# Patient Record
Sex: FEMALE | Race: WHITE | Employment: FULL TIME | URBAN - METROPOLITAN AREA
[De-identification: names, ages, dates, MRNs, and addresses within clinical notes are randomized per-mention and may not be internally consistent; named-entity substitution may affect disease eponyms.]

---

## 2017-07-21 ENCOUNTER — HOSPITAL ENCOUNTER (OUTPATIENT)
Dept: LAB | Age: 52
Discharge: HOME | End: 2017-07-21

## 2017-07-21 PROCEDURE — 88305 TISSUE EXAM BY PATHOLOGIST: CPT | Performed by: INTERNAL MEDICINE

## 2017-08-11 ENCOUNTER — HOSPITAL ENCOUNTER (OUTPATIENT)
Dept: MAMMOGRAPHY | Age: 52
Discharge: HOME | End: 2017-08-11
Attending: OBSTETRICS & GYNECOLOGY
Payer: COMMERCIAL

## 2017-08-11 DIAGNOSIS — Z12.31 VISIT FOR SCREENING MAMMOGRAM: ICD-10-CM

## 2017-08-11 PROCEDURE — 77067 SCR MAMMO BI INCL CAD: CPT

## 2017-08-23 ENCOUNTER — HOSPITAL ENCOUNTER (OUTPATIENT)
Dept: INFUSION THERAPY | Age: 52
Discharge: HOME | End: 2017-08-23
Payer: COMMERCIAL

## 2017-08-23 VITALS
TEMPERATURE: 98 F | SYSTOLIC BLOOD PRESSURE: 139 MMHG | OXYGEN SATURATION: 99 % | DIASTOLIC BLOOD PRESSURE: 89 MMHG | HEART RATE: 80 BPM | RESPIRATION RATE: 18 BRPM

## 2017-08-23 PROCEDURE — 96365 THER/PROPH/DIAG IV INF INIT: CPT

## 2017-08-23 PROCEDURE — 74011250636 HC RX REV CODE- 250/636

## 2017-08-23 RX ORDER — SODIUM CHLORIDE 0.9 % (FLUSH) 0.9 %
10 SYRINGE (ML) INJECTION AS NEEDED
Status: ACTIVE | OUTPATIENT
Start: 2017-08-23 | End: 2017-08-23

## 2017-08-23 RX ADMIN — SODIUM CHLORIDE: 900 INJECTION, SOLUTION INTRAVENOUS at 07:48

## 2017-08-23 RX ADMIN — Medication 10 ML: at 07:20

## 2017-08-23 RX ADMIN — Medication 10 ML: at 08:20

## 2017-08-23 NOTE — PROGRESS NOTES
Arrived to [facility] to receive Solu-medrol day 1 of 3. Tolerated well. Patient reports receiving medication in the past and tolerating well. Issues or concerns during appointment: none. Aware of next appointment on 8/24 at 7:15 AM.  Discharged ambulatory.

## 2017-08-24 ENCOUNTER — HOSPITAL ENCOUNTER (OUTPATIENT)
Dept: INFUSION THERAPY | Age: 52
Discharge: HOME | End: 2017-08-24
Payer: COMMERCIAL

## 2017-08-24 VITALS
HEART RATE: 70 BPM | TEMPERATURE: 98.2 F | DIASTOLIC BLOOD PRESSURE: 65 MMHG | SYSTOLIC BLOOD PRESSURE: 116 MMHG | OXYGEN SATURATION: 98 % | RESPIRATION RATE: 18 BRPM

## 2017-08-24 PROCEDURE — 74011250636 HC RX REV CODE- 250/636

## 2017-08-24 PROCEDURE — 96365 THER/PROPH/DIAG IV INF INIT: CPT

## 2017-08-24 RX ORDER — SODIUM CHLORIDE 0.9 % (FLUSH) 0.9 %
5 SYRINGE (ML) INJECTION AS NEEDED
Status: DISCONTINUED | OUTPATIENT
Start: 2017-08-24 | End: 2017-08-28 | Stop reason: HOSPADM

## 2017-08-24 RX ADMIN — Medication 5 ML: at 08:17

## 2017-08-24 RX ADMIN — SODIUM CHLORIDE: 900 INJECTION, SOLUTION INTRAVENOUS at 07:46

## 2017-08-24 NOTE — PROGRESS NOTES
Arrived to [facility] to receive Solu-medrol day 2 of 3. Tolerated well. Patient reports receiving medication in the past and tolerating well. Issues or concerns during appointment: none. Aware of next appointment on 8/25 at 7:15 AM.  Discharged ambulatory.

## 2017-08-25 ENCOUNTER — HOSPITAL ENCOUNTER (OUTPATIENT)
Dept: INFUSION THERAPY | Age: 52
Discharge: HOME | End: 2017-08-25
Payer: COMMERCIAL

## 2017-08-25 VITALS
SYSTOLIC BLOOD PRESSURE: 125 MMHG | OXYGEN SATURATION: 99 % | DIASTOLIC BLOOD PRESSURE: 67 MMHG | TEMPERATURE: 97.7 F | HEART RATE: 60 BPM | RESPIRATION RATE: 18 BRPM

## 2017-08-25 PROCEDURE — 96365 THER/PROPH/DIAG IV INF INIT: CPT

## 2017-08-25 PROCEDURE — 74011250636 HC RX REV CODE- 250/636

## 2017-08-25 RX ORDER — SODIUM CHLORIDE 0.9 % (FLUSH) 0.9 %
10-40 SYRINGE (ML) INJECTION AS NEEDED
Status: DISCONTINUED | OUTPATIENT
Start: 2017-08-25 | End: 2017-08-29 | Stop reason: HOSPADM

## 2017-08-25 RX ADMIN — Medication 10 ML: at 07:45

## 2017-08-25 RX ADMIN — SODIUM CHLORIDE: 900 INJECTION, SOLUTION INTRAVENOUS at 08:00

## 2017-08-25 NOTE — PROGRESS NOTES
Pt. Discharged ambulatory. Tolerated infusion well. No distress noted. No return appointment needed at this time.

## 2017-10-05 ENCOUNTER — HOSPITAL ENCOUNTER (OUTPATIENT)
Dept: GENERAL RADIOLOGY | Age: 52
Discharge: HOME | End: 2017-10-05
Payer: COMMERCIAL

## 2017-10-05 DIAGNOSIS — M54.2 NECK PAIN: ICD-10-CM

## 2017-10-05 PROCEDURE — 72050 X-RAY EXAM NECK SPINE 4/5VWS: CPT

## 2017-11-15 PROBLEM — Z79.890 HORMONE REPLACEMENT THERAPY (POSTMENOPAUSAL): Status: ACTIVE | Noted: 2017-11-15

## 2017-11-15 PROBLEM — Z88.8 ASPIRIN ALLERGY: Status: ACTIVE | Noted: 2017-11-15

## 2017-11-15 PROBLEM — E55.9 VITAMIN D DEFICIENCY: Status: ACTIVE | Noted: 2017-11-15

## 2017-11-15 PROBLEM — E78.00 PURE HYPERCHOLESTEROLEMIA: Status: ACTIVE | Noted: 2017-11-15

## 2018-04-09 PROBLEM — Z79.899 ENCOUNTER FOR MEDICATION MANAGEMENT: Status: ACTIVE | Noted: 2018-04-09

## 2018-04-09 PROBLEM — T50.905A MEDICATION SIDE EFFECT, INITIAL ENCOUNTER: Status: ACTIVE | Noted: 2018-04-09

## 2018-10-06 ENCOUNTER — HOSPITAL ENCOUNTER (OUTPATIENT)
Dept: MAMMOGRAPHY | Age: 53
Discharge: HOME | End: 2018-10-06
Attending: OBSTETRICS & GYNECOLOGY
Payer: COMMERCIAL

## 2018-10-06 DIAGNOSIS — Z12.31 VISIT FOR SCREENING MAMMOGRAM: ICD-10-CM

## 2018-10-06 PROCEDURE — 77067 SCR MAMMO BI INCL CAD: CPT

## 2018-11-30 ENCOUNTER — HOSPITAL ENCOUNTER (OUTPATIENT)
Dept: GENERAL RADIOLOGY | Age: 53
Discharge: HOME | End: 2018-11-30
Attending: INTERNAL MEDICINE
Payer: COMMERCIAL

## 2018-11-30 DIAGNOSIS — K21.00 GASTROESOPHAGEAL REFLUX DISEASE WITH ESOPHAGITIS: ICD-10-CM

## 2018-11-30 DIAGNOSIS — G35 MULTIPLE SCLEROSIS (HCC): ICD-10-CM

## 2018-11-30 DIAGNOSIS — E78.00 PURE HYPERCHOLESTEROLEMIA: ICD-10-CM

## 2018-11-30 PROCEDURE — 71046 X-RAY EXAM CHEST 2 VIEWS: CPT

## 2018-12-22 NOTE — PROGRESS NOTES
There is no abnormality on the neck x-ray. You can access the [facility] Portal, offered by [facility], by registering with the following website: [email or web address]

## 2019-01-21 ENCOUNTER — HOSPITAL ENCOUNTER (OUTPATIENT)
Dept: CT IMAGING | Age: 54
Discharge: HOME | End: 2019-01-21
Payer: COMMERCIAL

## 2019-01-21 ENCOUNTER — HOSPITAL ENCOUNTER (OUTPATIENT)
Dept: LAB | Age: 54
Discharge: HOME | End: 2019-01-21
Payer: COMMERCIAL

## 2019-01-21 DIAGNOSIS — R10.10 PAIN OF UPPER ABDOMEN: ICD-10-CM

## 2019-01-21 DIAGNOSIS — R10.2 PELVIC PAIN IN FEMALE: ICD-10-CM

## 2019-01-21 DIAGNOSIS — R11.0 NAUSEA: ICD-10-CM

## 2019-01-21 DIAGNOSIS — R31.9 HEMATURIA, UNSPECIFIED TYPE: ICD-10-CM

## 2019-01-21 LAB
ALBUMIN SERPL-MCNC: 4.1 G/DL (ref 3.5–5)
ALBUMIN/GLOB SERPL: 1.1 {RATIO}
ALP SERPL-CCNC: 90 U/L (ref 50–136)
ALT SERPL-CCNC: 35 U/L (ref 12–65)
ANION GAP SERPL CALC-SCNC: 10 MMOL/L
AST SERPL-CCNC: 22 U/L (ref 15–37)
BASOPHILS # BLD: 0 K/UL (ref 0–0.2)
BASOPHILS NFR BLD: 1 % (ref 0–2)
BILIRUB SERPL-MCNC: 0.5 MG/DL (ref 0.2–1.1)
BUN SERPL-MCNC: 7 MG/DL (ref 6–23)
CALCIUM SERPL-MCNC: 9.2 MG/DL (ref 8.3–10.4)
CHLORIDE SERPL-SCNC: 108 MMOL/L (ref 98–107)
CO2 SERPL-SCNC: 24 MMOL/L (ref 21–32)
CREAT SERPL-MCNC: 0.9 MG/DL (ref 0.6–1)
DIFFERENTIAL METHOD BLD: ABNORMAL
EOSINOPHIL # BLD: 0.1 K/UL (ref 0–0.8)
EOSINOPHIL NFR BLD: 1 % (ref 0.5–7.8)
ERYTHROCYTE [DISTWIDTH] IN BLOOD BY AUTOMATED COUNT: 11.6 % (ref 11.9–14.6)
GLOBULIN SER CALC-MCNC: 3.9 G/DL
GLUCOSE SERPL-MCNC: 93 MG/DL (ref 65–100)
HCT VFR BLD AUTO: 45.7 % (ref 35.8–46.3)
HGB BLD-MCNC: 15.7 G/DL (ref 11.7–15.4)
IMM GRANULOCYTES # BLD AUTO: 0 K/UL (ref 0–0.5)
IMM GRANULOCYTES NFR BLD AUTO: 0 % (ref 0–5)
LIPASE SERPL-CCNC: 140 U/L (ref 73–393)
LYMPHOCYTES # BLD: 1.3 K/UL (ref 0.5–4.6)
LYMPHOCYTES NFR BLD: 22 % (ref 13–44)
MCH RBC QN AUTO: 30.3 PG (ref 26.1–32.9)
MCHC RBC AUTO-ENTMCNC: 34.4 G/DL (ref 31.4–35)
MCV RBC AUTO: 88.2 FL (ref 79.6–97.8)
MONOCYTES # BLD: 0.3 K/UL (ref 0.1–1.3)
MONOCYTES NFR BLD: 4 % (ref 4–12)
NEUTS SEG # BLD: 4.4 K/UL (ref 1.7–8.2)
NEUTS SEG NFR BLD: 72 % (ref 43–78)
NRBC # BLD: 0 K/UL (ref 0–0.2)
PLATELET # BLD AUTO: 261 K/UL (ref 150–450)
PMV BLD AUTO: 9.4 FL (ref 9.4–12.3)
POTASSIUM SERPL-SCNC: 3.4 MMOL/L (ref 3.5–5.1)
PROT SERPL-MCNC: 8 G/DL (ref 6.3–8.2)
RBC # BLD AUTO: 5.18 M/UL (ref 4.05–5.2)
SODIUM SERPL-SCNC: 142 MMOL/L (ref 136–145)
WBC # BLD AUTO: 6.1 K/UL (ref 4.3–11.1)

## 2019-01-21 PROCEDURE — 80053 COMPREHEN METABOLIC PANEL: CPT

## 2019-01-21 PROCEDURE — 83690 ASSAY OF LIPASE: CPT

## 2019-01-21 PROCEDURE — 85025 COMPLETE CBC W/AUTO DIFF WBC: CPT

## 2019-01-21 PROCEDURE — 74176 CT ABD & PELVIS W/O CONTRAST: CPT

## 2019-01-21 PROCEDURE — 36415 COLL VENOUS BLD VENIPUNCTURE: CPT

## 2019-01-21 NOTE — PROGRESS NOTES
The lipase is negative also. This test was done due to the epigastric pain and nausea. It is an evaluation of the pancreas.

## 2019-01-25 ENCOUNTER — HOSPITAL ENCOUNTER (OUTPATIENT)
Dept: GENERAL RADIOLOGY | Age: 54
Discharge: HOME | End: 2019-01-25
Payer: COMMERCIAL

## 2019-01-25 VITALS — WEIGHT: 183 LBS | HEIGHT: 66 IN | BODY MASS INDEX: 29.41 KG/M2

## 2019-01-25 DIAGNOSIS — K21.00 GASTROESOPHAGEAL REFLUX DISEASE WITH ESOPHAGITIS: ICD-10-CM

## 2019-01-25 DIAGNOSIS — R10.10 PAIN OF UPPER ABDOMEN: ICD-10-CM

## 2019-01-25 DIAGNOSIS — R14.0 ABDOMINAL BLOATING: ICD-10-CM

## 2019-01-25 PROCEDURE — 74011000250 HC RX REV CODE- 250: Performed by: NURSE PRACTITIONER

## 2019-01-25 PROCEDURE — 74011000255 HC RX REV CODE- 255: Performed by: NURSE PRACTITIONER

## 2019-01-25 PROCEDURE — 74241 XR UPPER GI SERIES W KUB: CPT

## 2019-01-25 RX ADMIN — BARIUM SULFATE 135 ML: 980 POWDER, FOR SUSPENSION ORAL at 09:13

## 2019-01-25 RX ADMIN — BARIUM SULFATE 60 ML: 0.6 SUSPENSION ORAL at 09:15

## 2019-01-25 RX ADMIN — BARIUM SULFATE 700 MG: 700 TABLET ORAL at 09:16

## 2019-01-25 RX ADMIN — ANTACID/ANTIFLATULENT 4 G: 380; 550; 10; 10 GRANULE, EFFERVESCENT ORAL at 09:18

## 2019-02-15 ENCOUNTER — HOSPITAL ENCOUNTER (OUTPATIENT)
Dept: SURGERY | Age: 54
Discharge: HOME | End: 2019-02-15

## 2019-02-18 VITALS — BODY MASS INDEX: 28.25 KG/M2 | HEIGHT: 67 IN | WEIGHT: 180 LBS

## 2019-02-18 RX ORDER — AMOXICILLIN AND CLAVULANATE POTASSIUM 875; 125 MG/1; MG/1
TABLET, FILM COATED ORAL 2 TIMES DAILY
COMMUNITY
End: 2019-03-11

## 2019-02-18 RX ORDER — LEVOCETIRIZINE DIHYDROCHLORIDE 5 MG/1
5 TABLET, FILM COATED ORAL
COMMUNITY
End: 2020-05-21

## 2019-02-18 NOTE — PERIOP NOTES
Patient verified name and . Order for consent NOT found in EHR, unable to confirm case posting at this time; patient verifies procedure. Type 1B surgery, PAT phone assessment complete. Orders NOT received. Labs per surgeon: No orders received at this time. Labs per anesthesia protocol: None. Patient reports  mg of Augmentin x 10 days on 19 due to \"cold;\" surgery scheduled 19. Patient repots flu test negative and strep test negative. Ren Santamaria, Dr. Ankush Levin nurse, made aware, \"I think we are good with that but I'll pass it on. \"       Patient answered medical/surgical history questions at their best of ability. All prior to admission medications documented in [facility]. Patient instructed to take the following medications the day of surgery according to anesthesia guidelines with a small sip of water: Estradiol, Omeprazole, and Valtrex. Hold all vitamins 7 days prior to surgery and NSAIDS 5 days prior to surgery. Patient instructed on the following:  Arrive at [address], time of arrival to be called the day before by 1700  NPO after midnight including gum, mints, and ice chips  Responsible adult must drive patient to the hospital, stay during surgery, and patient will need supervision 24 hours after anesthesia  Use anti-bacterial soap in shower the night before surgery and on the morning of surgery  All piercings must be removed prior to arrival.    Leave all valuables (money and jewelry) at home but bring insurance card and ID on       DOS. Do not wear make-up, nail polish, lotions, cologne, perfumes, powders, or oil on skin. Patient teach back successful and patient demonstrates knowledge of instruction.

## 2019-02-21 ENCOUNTER — ANESTHESIA EVENT (OUTPATIENT)
Dept: SURGERY | Age: 54
End: 2019-02-21
Payer: COMMERCIAL

## 2019-02-22 ENCOUNTER — HOSPITAL ENCOUNTER (OUTPATIENT)
Age: 54
Setting detail: OUTPATIENT SURGERY
Discharge: HOME | End: 2019-02-22
Attending: OBSTETRICS & GYNECOLOGY | Admitting: OBSTETRICS & GYNECOLOGY
Payer: COMMERCIAL

## 2019-02-22 ENCOUNTER — ANESTHESIA (OUTPATIENT)
Dept: SURGERY | Age: 54
End: 2019-02-22
Payer: COMMERCIAL

## 2019-02-22 VITALS
RESPIRATION RATE: 16 BRPM | OXYGEN SATURATION: 94 % | HEART RATE: 55 BPM | DIASTOLIC BLOOD PRESSURE: 65 MMHG | SYSTOLIC BLOOD PRESSURE: 133 MMHG | TEMPERATURE: 98 F

## 2019-02-22 DIAGNOSIS — G89.18 POSTOPERATIVE PAIN: Primary | ICD-10-CM

## 2019-02-22 LAB — HCG UR QL: NEGATIVE

## 2019-02-22 PROCEDURE — 77030020782 HC GWN BAIR PAWS FLX 3M -B: Performed by: NURSE ANESTHETIST, CERTIFIED REGISTERED

## 2019-02-22 PROCEDURE — 76210000020 HC REC RM PH II FIRST 0.5 HR: Performed by: OBSTETRICS & GYNECOLOGY

## 2019-02-22 PROCEDURE — 76210000006 HC OR PH I REC 0.5 TO 1 HR: Performed by: OBSTETRICS & GYNECOLOGY

## 2019-02-22 PROCEDURE — 77030010509 HC AIRWY LMA MSK TELE -A: Performed by: NURSE ANESTHETIST, CERTIFIED REGISTERED

## 2019-02-22 PROCEDURE — 77030012317 HC CATH URET INT COVD -A: Performed by: OBSTETRICS & GYNECOLOGY

## 2019-02-22 PROCEDURE — 74011250636 HC RX REV CODE- 250/636

## 2019-02-22 PROCEDURE — 77030032490 HC SLV COMPR SCD KNE COVD -B: Performed by: OBSTETRICS & GYNECOLOGY

## 2019-02-22 PROCEDURE — 88305 TISSUE EXAM BY PATHOLOGIST: CPT

## 2019-02-22 PROCEDURE — 81025 URINE PREGNANCY TEST: CPT

## 2019-02-22 PROCEDURE — 77030018836 HC SOL IRR NACL ICUM -A: Performed by: OBSTETRICS & GYNECOLOGY

## 2019-02-22 PROCEDURE — 74011250636 HC RX REV CODE- 250/636: Performed by: ANESTHESIOLOGY

## 2019-02-22 PROCEDURE — 76060000032 HC ANESTHESIA 0.5 TO 1 HR: Performed by: OBSTETRICS & GYNECOLOGY

## 2019-02-22 PROCEDURE — 76010000138 HC OR TIME 0.5 TO 1 HR: Performed by: OBSTETRICS & GYNECOLOGY

## 2019-02-22 RX ORDER — SODIUM CHLORIDE, SODIUM LACTATE, POTASSIUM CHLORIDE, CALCIUM CHLORIDE 600; 310; 30; 20 MG/100ML; MG/100ML; MG/100ML; MG/100ML
100 INJECTION, SOLUTION INTRAVENOUS CONTINUOUS
Status: DISCONTINUED | OUTPATIENT
Start: 2019-02-22 | End: 2019-02-22 | Stop reason: HOSPADM

## 2019-02-22 RX ORDER — PROPOFOL 10 MG/ML
INJECTION, EMULSION INTRAVENOUS AS NEEDED
Status: DISCONTINUED | OUTPATIENT
Start: 2019-02-22 | End: 2019-02-22 | Stop reason: HOSPADM

## 2019-02-22 RX ORDER — LIDOCAINE HYDROCHLORIDE 10 MG/ML
0.1 INJECTION INFILTRATION; PERINEURAL AS NEEDED
Status: DISCONTINUED | OUTPATIENT
Start: 2019-02-22 | End: 2019-02-22 | Stop reason: HOSPADM

## 2019-02-22 RX ORDER — FENTANYL CITRATE 50 UG/ML
100 INJECTION, SOLUTION INTRAMUSCULAR; INTRAVENOUS ONCE
Status: DISCONTINUED | OUTPATIENT
Start: 2019-02-22 | End: 2019-02-22 | Stop reason: HOSPADM

## 2019-02-22 RX ORDER — TRAMADOL HYDROCHLORIDE 50 MG/1
50 TABLET ORAL
Qty: 8 TAB | Refills: 0 | Status: SHIPPED | OUTPATIENT
Start: 2019-02-22 | End: 2019-03-11

## 2019-02-22 RX ORDER — MIDAZOLAM HYDROCHLORIDE 1 MG/ML
2 INJECTION, SOLUTION INTRAMUSCULAR; INTRAVENOUS
Status: DISCONTINUED | OUTPATIENT
Start: 2019-02-22 | End: 2019-02-22 | Stop reason: HOSPADM

## 2019-02-22 RX ORDER — HYDROMORPHONE HYDROCHLORIDE 2 MG/ML
0.5 INJECTION, SOLUTION INTRAMUSCULAR; INTRAVENOUS; SUBCUTANEOUS
Status: DISCONTINUED | OUTPATIENT
Start: 2019-02-22 | End: 2019-02-22 | Stop reason: HOSPADM

## 2019-02-22 RX ORDER — MIDAZOLAM HYDROCHLORIDE 1 MG/ML
2 INJECTION, SOLUTION INTRAMUSCULAR; INTRAVENOUS ONCE
Status: COMPLETED | OUTPATIENT
Start: 2019-02-22 | End: 2019-02-22

## 2019-02-22 RX ORDER — OXYCODONE HYDROCHLORIDE 5 MG/1
5 TABLET ORAL
Status: DISCONTINUED | OUTPATIENT
Start: 2019-02-22 | End: 2019-02-22 | Stop reason: HOSPADM

## 2019-02-22 RX ORDER — ONDANSETRON 2 MG/ML
INJECTION INTRAMUSCULAR; INTRAVENOUS AS NEEDED
Status: DISCONTINUED | OUTPATIENT
Start: 2019-02-22 | End: 2019-02-22 | Stop reason: HOSPADM

## 2019-02-22 RX ORDER — DEXAMETHASONE SODIUM PHOSPHATE 4 MG/ML
INJECTION, SOLUTION INTRA-ARTICULAR; INTRALESIONAL; INTRAMUSCULAR; INTRAVENOUS; SOFT TISSUE AS NEEDED
Status: DISCONTINUED | OUTPATIENT
Start: 2019-02-22 | End: 2019-02-22 | Stop reason: HOSPADM

## 2019-02-22 RX ORDER — FENTANYL CITRATE 50 UG/ML
INJECTION, SOLUTION INTRAMUSCULAR; INTRAVENOUS AS NEEDED
Status: DISCONTINUED | OUTPATIENT
Start: 2019-02-22 | End: 2019-02-22 | Stop reason: HOSPADM

## 2019-02-22 RX ORDER — LIDOCAINE HYDROCHLORIDE 20 MG/ML
INJECTION, SOLUTION EPIDURAL; INFILTRATION; INTRACAUDAL; PERINEURAL AS NEEDED
Status: DISCONTINUED | OUTPATIENT
Start: 2019-02-22 | End: 2019-02-22 | Stop reason: HOSPADM

## 2019-02-22 RX ORDER — NALOXONE HYDROCHLORIDE 0.4 MG/ML
0.04 INJECTION, SOLUTION INTRAMUSCULAR; INTRAVENOUS; SUBCUTANEOUS
Status: DISCONTINUED | OUTPATIENT
Start: 2019-02-22 | End: 2019-02-22 | Stop reason: HOSPADM

## 2019-02-22 RX ADMIN — HYDROMORPHONE HYDROCHLORIDE 0.5 MG: 2 INJECTION, SOLUTION INTRAMUSCULAR; INTRAVENOUS; SUBCUTANEOUS at 11:08

## 2019-02-22 RX ADMIN — SODIUM CHLORIDE, SODIUM LACTATE, POTASSIUM CHLORIDE, AND CALCIUM CHLORIDE 100 ML/HR: 600; 310; 30; 20 INJECTION, SOLUTION INTRAVENOUS at 09:58

## 2019-02-22 RX ADMIN — LIDOCAINE HYDROCHLORIDE 100 MG: 20 INJECTION, SOLUTION EPIDURAL; INFILTRATION; INTRACAUDAL; PERINEURAL at 10:27

## 2019-02-22 RX ADMIN — FENTANYL CITRATE 25 MCG: 50 INJECTION, SOLUTION INTRAMUSCULAR; INTRAVENOUS at 10:41

## 2019-02-22 RX ADMIN — PROPOFOL 200 MG: 10 INJECTION, EMULSION INTRAVENOUS at 10:27

## 2019-02-22 RX ADMIN — DEXAMETHASONE SODIUM PHOSPHATE 10 MG: 4 INJECTION, SOLUTION INTRA-ARTICULAR; INTRALESIONAL; INTRAMUSCULAR; INTRAVENOUS; SOFT TISSUE at 10:36

## 2019-02-22 RX ADMIN — FENTANYL CITRATE 25 MCG: 50 INJECTION, SOLUTION INTRAMUSCULAR; INTRAVENOUS at 10:39

## 2019-02-22 RX ADMIN — HYDROMORPHONE HYDROCHLORIDE 0.5 MG: 2 INJECTION, SOLUTION INTRAMUSCULAR; INTRAVENOUS; SUBCUTANEOUS at 11:15

## 2019-02-22 RX ADMIN — MIDAZOLAM 2 MG: 1 INJECTION INTRAMUSCULAR; INTRAVENOUS at 10:14

## 2019-02-22 RX ADMIN — ONDANSETRON 4 MG: 2 INJECTION INTRAMUSCULAR; INTRAVENOUS at 10:37

## 2019-02-22 RX ADMIN — FENTANYL CITRATE 50 MCG: 50 INJECTION, SOLUTION INTRAMUSCULAR; INTRAVENOUS at 10:27

## 2019-02-22 NOTE — ANESTHESIA POSTPROCEDURE EVALUATION
Procedure(s): DILATATION AND CURETTAGE HYSTEROSCOPY. Anesthesia Post Evaluation Patient location during evaluation: PACU Patient participation: complete - patient participated Level of consciousness: awake Pain management: satisfactory to patient Airway patency: patent Anesthetic complications: no 
Cardiovascular status: hemodynamically stable Respiratory status: spontaneous ventilation Hydration status: euvolemic Post anesthesia nausea and vomiting:  none Visit Vitals /80 Pulse 78 Temp 36.7 °C (98 °F) Resp 16 SpO2 95%

## 2019-02-22 NOTE — OP NOTES
HYSTEROSCOPY WITH DILATATION AND CURETTAGE    DATE OF PROCEDURE: 2/22/2019     PREOPERATIVE DIAGNOSIS: Thickened endometrium [R93.89]  Postmenopausal bleeding [N95.0]  Endometrial polyp [N84.0]     POSTOPERATWE DIAGNOSIS: Thickened endometrium [R93.89]  Postmenopausal bleeding [N95.0]  Endometrial polyp [N84.0]    PROCEDURE: Hysteroscopy with dilatation & curettage. SURGEON: Hardy Clemons MD    ANESTHESIA: General.    DRAINS: Sterile in and out catheterization of the bladder. FINDINGS: endometrial thickening, polypoid area noted    PROCEDURE IN DETAIL: The patient was taken to the Operating Room. After adequate anesthesia was established she was properly positioned, scrubbed, prepped and draped. Time out was done to confirm the operating procedure, surgeon, patient and site. Once confirmed by the team, procedure was started. The weighted speculum was placed in the vault to expose the cervix, was grasped at 12 oclock with the single-tooth tenaculum and sounded to 8 cm. It was sequentially dilated prior to the hysteroscope being inserted with the above noted findings. Polypoid area removed with stone forceps. A very gentle but homogenous curetting was done starting in the midline and working in a clockwise manner. Endometrial tissue was retrieved. The hysteroscope was reinserted again. With this complete and thorough visual review, we terminated the procedure. With the sponge, instrument and needle count correct, the patient was awakened and taken to the Recovery Room in satisfactory condition. BLOOD LOSS ESTIMATED: Minimal    SPECIMENS:Endometrial curettings. Pt discharged to home . Precautions given . Appt 2 weeks.  Rx for Tramadol  50 mg (#8)

## 2019-02-22 NOTE — ANESTHESIA PREPROCEDURE EVALUATION
Anesthetic History No history of anesthetic complications Review of Systems / Medical History Pertinent labs reviewed Pulmonary Within defined limits Neuro/Psych Neuromuscular disease (MS, balance issues, optic neuritis) and psychiatric history Cardiovascular Within defined limits Exercise tolerance: >4 METS 
  
GI/Hepatic/Renal 
  
GERD: well controlled Endo/Other Within defined limits Other Findings Physical Exam 
 
Airway Mallampati: II 
TM Distance: 4 - 6 cm Neck ROM: normal range of motion Mouth opening: Normal 
 
 Cardiovascular Regular rate and rhythm,  S1 and S2 normal,  no murmur, click, rub, or gallop Dental 
No notable dental hx Pulmonary Breath sounds clear to auscultation Abdominal 
GI exam deferred Other Findings Anesthetic Plan ASA: 2 Anesthesia type: general 
 
 
 
 
Induction: Intravenous Anesthetic plan and risks discussed with: Patient and Spouse

## 2019-02-22 NOTE — H&P
Subjective:  
 
Patient is a 53 y.o.  female presents with postmenopausal bleeding. gradually worsening course. Patient Active Problem List  
 Diagnosis Date Noted  Medication side effect, initial encounter 2018  Encounter for medication management 2018  Hormone replacement therapy (postmenopausal) 11/15/2017  Vitamin D deficiency 11/15/2017  Pure hypercholesterolemia 11/15/2017  Aspirin allergy 11/15/2017  Elevated ALT measurement 2016  Pelvic pain in female 2016  S/P cataract extraction and insertion of intraocular lens 2016  Relapsing remitting multiple sclerosis ([facility] [address]  GERD (gastroesophageal reflux disease)  Personal history of colonic polyps  Internal hemorrhoids Past Medical History:  
Diagnosis Date  Anxiety  Autoimmune disease ([address] MS  
 Endometriosis  Environmental and seasonal allergies  Family history of colonic polyps   
 mother  Fever blister  GERD (gastroesophageal reflux disease)   
 controlled with medication  H/O echocardiogram 2017 EF 55-60%  History of palpitations r/t medication  Internal hemorrhoids without mention of complication 4339  Multiple sclerosis ([facility] [address] Followed by Dr. Linda Way, no medication at this time (19)  Ovarian cyst   
 Personal history of colonic polyps   
 TVA, TA  
 Rectocele   S/P cataract extraction and insertion of intraocular lens 3/3/2016 Past Surgical History:  
Procedure Laterality Date  HX CATARACT REMOVAL Bilateral   
 with iol  HX  SECTION    
 HX COLONOSCOPY  2014, 2017 -[facility]--1.5 cm sigmoid TVA, 5 small asc TAs--3 year recall;  -- mixed TVA  HX PELVIC LAPAROSCOPY    
 laproscopic for endometriosis x14  
  
[unfilled] Allergies Allergen Reactions  Latex Itching  Tecfidera [Dimethyl Fumarate] Other (comments) Hip pain severe dissipated on d/c Tecfidera. Palpitations  Adhesive Tape-Silicones Unknown (comments)  Aspirin Swelling  Flagyl [Metronidazole] Shortness of Breath  Milk Containing Products Hives Stomach pain  Shellfish Derived Hives Pt has never had a reaction to IV contrast Dye Social History Tobacco Use  Smoking status: Former Smoker Packs/day: 0.50 Years: 17.00 Pack years: 8.50 Last attempt to quit: 2001 Years since quittin.1  Smokeless tobacco: Never Used Substance Use Topics  Alcohol use: No  
  
Family History Problem Relation Age of Onset  Hypertension Mother  Hypertension Father  Diabetes Father  Heart Disease Father  Elevated Lipids Father  Diabetes Brother  Elevated Lipids Brother  Hypertension Brother  Breast Cancer Paternal Aunt  Heart Disease Maternal Grandfather  Stroke Paternal Grandmother  Heart Disease Paternal Grandmother  Breast Cancer Maternal Grandmother 79 Review of Systems A comprehensive review of systems was negative except for that written in the HPI. Objective:  
 
No data found. No intake or output data in the 24 hours ending 19 0945 General: Alert . Oriented x3 HEENT: Normocephalic PEERL Heart: RRR Lungs: Clear Abdominal: Bowel sounds are normal, liver is not enlarged, spleen is not enlarged Neurological: Alert and oriented X 3, normal strength and tone. Normal symmetric reflexes. Normal coordination and gait Extremities: extremities normal, atraumatic, no cyanosis or edema Assessment:  
 
Patient Active Problem List  
 Diagnosis Date Noted  Medication side effect, initial encounter 2018  Encounter for medication management 2018  Hormone replacement therapy (postmenopausal) 11/15/2017  Vitamin D deficiency 11/15/2017  Pure hypercholesterolemia 11/15/2017  Aspirin allergy 11/15/2017  Elevated ALT measurement 2016  Pelvic pain in female 09/29/2016  S/P cataract extraction and insertion of intraocular lens 03/03/2016  Relapsing remitting multiple sclerosis ([facility] [address]  GERD (gastroesophageal reflux disease)  Personal history of colonic polyps  Internal hemorrhoids Postmenopausal bleeding Plan:  
 
 
Procedure(s): DILATATION AND CURETTAGE HYSTEROSCOPY

## 2019-02-22 NOTE — DISCHARGE INSTRUCTIONS
Dilation and Curettage: What to Expect at Home  Your Recovery  Dilation and curettage (D&C) is a procedure to remove tissue from the inside of the uterus. The doctor used a curved tool, called a curette, to gently scrape tissue from your uterus. You are likely to have a backache, or cramps similar to menstrual cramps, and pass small clots of blood from your vagina for the first few days. You may continue to have light vaginal bleeding for several weeks after the procedure. You will probably be able to go back to most of your normal activities in 1 or 2 days. This care sheet gives you a general idea about how long it will take for you to recover. But each person recovers at a different pace. Follow the steps below to get better as quickly as possible. How can you care for yourself at home? Activity    · Rest when you feel tired. Getting enough sleep will help you recover.     · Avoid strenuous activities, such as bicycle riding, jogging, weight lifting, or aerobic exercise, until your doctor says it is okay.     · Most women are able to return to work the day after the procedure.     · You may have some light vaginal bleeding. Wear sanitary pads if needed. Do not douche or use tampons for 2 weeks or until your doctor says it is okay.     · Ask your doctor when it is okay for you to have sex.     · If you could become pregnant, talk about birth control with your doctor. Do not try to become pregnant until your doctor says it is okay. Diet    · You can eat your normal diet. If your stomach is upset, try bland, low-fat foods like plain rice, broiled chicken, toast, and yogurt.     · Drink plenty of fluids (unless your doctor tells you not to). Medicines    · Your doctor will tell you if and when you can restart your medicines.  He or she will also give you instructions about taking any new medicines.     · If you take blood thinners, such as warfarin (Coumadin), clopidogrel (Plavix), or aspirin, be sure to talk to your doctor. He or she will tell you if and when to start taking those medicines again. Make sure that you understand exactly what your doctor wants you to do.     · Be safe with medicines. Take pain medicines exactly as directed. ? If the doctor gave you a prescription medicine for pain, take it as prescribed. ? If you are not taking a prescription pain medicine, ask your doctor if you can take an over-the-counter medicine.     · If you think your pain medicine is making you sick to your stomach:  ? Take your medicine after meals (unless your doctor has told you not to). ? Ask your doctor for a different pain medicine.     · If your doctor prescribed antibiotics, take them as directed. Do not stop taking them just because you feel better. You need to take the full course of antibiotics. Follow-up care is a key part of your treatment and safety. Be sure to make and go to all appointments, and call your doctor if you are having problems. It's also a good idea to know your test results and keep a list of the medicines you take. When should you call for help? Call 911 anytime you think you may need emergency care. For example, call if:    · You passed out (lost consciousness).     · You have chest pain, are short of breath, or cough up blood.    Call your doctor now or seek immediate medical care if:    · You have bright red vaginal bleeding that soaks one or more pads in an hour, or you have large clots.     · You have vaginal discharge that increases in amount or smells bad.     · You are sick to your stomach or cannot drink fluids.     · You have pain that does not get better after you take pain medicine.     · You cannot pass stools or gas.     · You have symptoms of a blood clot in your leg (called a deep vein thrombosis), such as:  ? Pain in your calf, back of the knee, thigh, or groin. ?  Redness and swelling in your leg.     · You have signs of infection, such as:  ? Increased pain, swelling, warmth, or redness. ? Red streaks leading from the area. ? Pus draining from the area. ? A fever.    Watch closely for changes in your health, and be sure to contact your doctor if you have any problems.

## 2019-02-25 NOTE — PROGRESS NOTES
Called pt today regarding a chipped tooth from surgery last Friday. I was made aware of this by [facility] today. I have no recollection of any problems during her care that could have led to a chipped tooth and none surfaced from a review of her record. This is especially odd since we used an LMA. She describes a small chip out of her \"right front tooth\". She plans to visit the dentist to see what options she has to improve the appearance. I provided her with the phone number for risk management. She was not in any way upset, but did express interest in contacting risk management to help with the cost of repair.

## 2019-10-03 ENCOUNTER — HOSPITAL ENCOUNTER (OUTPATIENT)
Dept: GENERAL RADIOLOGY | Age: 54
Discharge: HOME | End: 2019-10-03

## 2019-10-03 DIAGNOSIS — Z00.00 ROUTINE GENERAL MEDICAL EXAMINATION AT A HEALTH CARE FACILITY: ICD-10-CM

## 2019-11-19 PROBLEM — T50.905A MEDICATION SIDE EFFECT, INITIAL ENCOUNTER: Status: RESOLVED | Noted: 2018-04-09 | Resolved: 2019-11-19

## 2019-11-19 PROBLEM — R20.2 PARESTHESIA: Status: ACTIVE | Noted: 2019-11-19

## 2019-11-27 ENCOUNTER — HOSPITAL ENCOUNTER (OUTPATIENT)
Dept: GENERAL RADIOLOGY | Age: 54
Discharge: HOME | End: 2019-11-27
Attending: INTERNAL MEDICINE
Payer: COMMERCIAL

## 2019-11-27 DIAGNOSIS — R94.2 ABNORMAL PFT: ICD-10-CM

## 2019-11-27 PROCEDURE — 71046 X-RAY EXAM CHEST 2 VIEWS: CPT

## 2020-02-20 ENCOUNTER — HOSPITAL ENCOUNTER (OUTPATIENT)
Dept: MAMMOGRAPHY | Age: 55
Discharge: HOME | End: 2020-02-20
Attending: OBSTETRICS & GYNECOLOGY

## 2020-02-20 DIAGNOSIS — Z12.31 VISIT FOR SCREENING MAMMOGRAM: ICD-10-CM

## 2020-06-04 ENCOUNTER — HOSPITAL ENCOUNTER (OUTPATIENT)
Dept: CT IMAGING | Age: 55
Discharge: HOME | End: 2020-06-04
Attending: INTERNAL MEDICINE
Payer: COMMERCIAL

## 2020-06-04 DIAGNOSIS — R94.2 DECREASED DIFFUSION CAPACITY OF LUNG: ICD-10-CM

## 2020-06-04 DIAGNOSIS — R06.02 SOB (SHORTNESS OF BREATH): ICD-10-CM

## 2020-06-04 PROCEDURE — 71250 CT THORAX DX C-: CPT

## 2021-02-09 PROCEDURE — 88305 TISSUE EXAM BY PATHOLOGIST: CPT

## 2021-02-10 ENCOUNTER — HOSPITAL ENCOUNTER (OUTPATIENT)
Dept: LAB | Age: 56
Discharge: HOME | End: 2021-02-10

## 2021-02-11 NOTE — PROGRESS NOTES
Spoke to patient and relayed result. Patient voiced understanding and had no further questions at this time.

## 2021-03-24 PROBLEM — Z87.898 HX OF FEBRILE SEIZURE: Status: ACTIVE | Noted: 2021-03-24

## 2021-04-26 ENCOUNTER — TRANSCRIBE ORDER (OUTPATIENT)
Dept: SCHEDULING | Age: 56
End: 2021-04-26

## 2021-04-26 DIAGNOSIS — Z12.31 SCREENING MAMMOGRAM FOR HIGH-RISK PATIENT: Primary | ICD-10-CM

## 2021-06-07 ENCOUNTER — HOSPITAL ENCOUNTER (OUTPATIENT)
Dept: MAMMOGRAPHY | Age: 56
Discharge: HOME | End: 2021-06-07
Attending: NURSE PRACTITIONER
Payer: COMMERCIAL

## 2021-06-07 DIAGNOSIS — Z12.31 SCREENING MAMMOGRAM FOR HIGH-RISK PATIENT: ICD-10-CM

## 2021-06-07 PROCEDURE — 77063 BREAST TOMOSYNTHESIS BI: CPT

## 2021-09-07 PROBLEM — F41.9 ANXIETY: Status: ACTIVE | Noted: 2021-09-07

## 2021-11-29 PROBLEM — R93.1 ELEVATED CORONARY ARTERY CALCIUM SCORE: Chronic | Status: ACTIVE | Noted: 2021-11-29

## 2022-03-18 PROBLEM — Z79.899 ENCOUNTER FOR MEDICATION MANAGEMENT: Status: ACTIVE | Noted: 2018-04-09

## 2022-03-19 PROBLEM — Z88.8 ASPIRIN ALLERGY: Status: ACTIVE | Noted: 2017-11-15

## 2022-03-19 PROBLEM — F41.9 ANXIETY: Status: ACTIVE | Noted: 2021-09-07

## 2022-03-19 PROBLEM — E78.00 PURE HYPERCHOLESTEROLEMIA: Status: ACTIVE | Noted: 2017-11-15

## 2022-03-19 PROBLEM — R93.1 ELEVATED CORONARY ARTERY CALCIUM SCORE: Status: ACTIVE | Noted: 2021-11-29

## 2022-03-19 PROBLEM — E55.9 VITAMIN D DEFICIENCY: Status: ACTIVE | Noted: 2017-11-15

## 2022-03-19 PROBLEM — Z79.890 HORMONE REPLACEMENT THERAPY (POSTMENOPAUSAL): Status: ACTIVE | Noted: 2017-11-15

## 2022-03-19 PROBLEM — Z87.898 HX OF IDIOPATHIC SEIZURE: Status: ACTIVE | Noted: 2021-03-24

## 2022-03-19 PROBLEM — T50.905A MEDICATION SIDE EFFECT, INITIAL ENCOUNTER: Status: ACTIVE | Noted: 2018-04-09

## 2022-03-20 PROBLEM — R20.2 PARESTHESIA: Status: ACTIVE | Noted: 2019-11-19

## 2022-04-21 DIAGNOSIS — E78.00 PURE HYPERCHOLESTEROLEMIA: ICD-10-CM

## 2022-04-21 DIAGNOSIS — R74.01 ELEVATED ALT MEASUREMENT: Primary | ICD-10-CM

## 2022-05-24 DIAGNOSIS — R74.01 ELEVATED ALT MEASUREMENT: ICD-10-CM

## 2022-05-24 DIAGNOSIS — E78.00 PURE HYPERCHOLESTEROLEMIA: ICD-10-CM

## 2022-05-24 LAB
ALBUMIN SERPL-MCNC: 4.2 G/DL (ref 3.5–5)
ALBUMIN/GLOB SERPL: 1.2 {RATIO} (ref 1.2–3.5)
ALP SERPL-CCNC: 68 U/L (ref 50–136)
ALT SERPL-CCNC: 27 U/L (ref 12–65)
ANION GAP SERPL CALC-SCNC: 8 MMOL/L (ref 7–16)
AST SERPL-CCNC: 11 U/L (ref 15–37)
BILIRUB SERPL-MCNC: 0.5 MG/DL (ref 0.2–1.1)
BUN SERPL-MCNC: 5 MG/DL (ref 6–23)
CALCIUM SERPL-MCNC: 9.5 MG/DL (ref 8.3–10.4)
CHLORIDE SERPL-SCNC: 107 MMOL/L (ref 98–107)
CHOLEST SERPL-MCNC: 216 MG/DL
CO2 SERPL-SCNC: 24 MMOL/L (ref 21–32)
CREAT SERPL-MCNC: 0.7 MG/DL (ref 0.6–1)
GLOBULIN SER CALC-MCNC: 3.5 G/DL (ref 2.3–3.5)
GLUCOSE SERPL-MCNC: 76 MG/DL (ref 65–100)
HDLC SERPL-MCNC: 63 MG/DL (ref 40–60)
HDLC SERPL: 3.4 {RATIO}
LDLC SERPL CALC-MCNC: 127.4 MG/DL
POTASSIUM SERPL-SCNC: 4.1 MMOL/L (ref 3.5–5.1)
PROT SERPL-MCNC: 7.7 G/DL (ref 6.3–8.2)
SODIUM SERPL-SCNC: 139 MMOL/L (ref 136–145)
TRIGL SERPL-MCNC: 128 MG/DL (ref 35–150)
VLDLC SERPL CALC-MCNC: 25.6 MG/DL (ref 6–23)

## 2022-06-09 ENCOUNTER — OFFICE VISIT (OUTPATIENT)
Dept: GYNECOLOGY | Age: 57
End: 2022-06-09
Payer: COMMERCIAL

## 2022-06-09 VITALS
HEIGHT: 65 IN | SYSTOLIC BLOOD PRESSURE: 120 MMHG | WEIGHT: 174 LBS | DIASTOLIC BLOOD PRESSURE: 78 MMHG | BODY MASS INDEX: 28.99 KG/M2

## 2022-06-09 DIAGNOSIS — N95.1 MENOPAUSE SYNDROME: Primary | ICD-10-CM

## 2022-06-09 PROCEDURE — 99214 OFFICE O/P EST MOD 30 MIN: CPT | Performed by: OBSTETRICS & GYNECOLOGY

## 2022-06-09 RX ORDER — MEDROXYPROGESTERONE ACETATE 2.5 MG/1
2.5 TABLET ORAL DAILY
Qty: 90 TABLET | Refills: 3 | Status: SHIPPED | OUTPATIENT
Start: 2022-06-09 | End: 2022-07-11 | Stop reason: ALTCHOICE

## 2022-06-09 RX ORDER — ESTRADIOL 0.1 MG/D
1 FILM, EXTENDED RELEASE TRANSDERMAL
Qty: 24 PATCH | Refills: 3 | Status: SHIPPED | OUTPATIENT
Start: 2022-06-09 | End: 2022-07-11 | Stop reason: ALTCHOICE

## 2022-06-09 NOTE — PROGRESS NOTES
Melva Joshi is a 61 y.o. female seen to discuss hormones. She c/o hot flashes and night sweats but everything she has been tried on has caused her cramping. The hormone that worked the best for her was the patch but even this caused cramping. She has not been taking the progesterone with the estrogen. Past Medical History, Past Surgical History, Family history, Social History, and Medications were all reviewed with the patient today and updated as necessary. Current Outpatient Medications   Medication Sig    estradiol (VIVELLE-DOT) 0.1 MG/24HR Place 1 patch onto the skin Twice a Week    medroxyPROGESTERone (PROVERA) 2.5 MG tablet Take 1 tablet by mouth daily    atorvastatin (LIPITOR) 10 MG tablet Take 10 mg by mouth daily    cyanocobalamin 1000 MCG/ML injection Inject 1,000 mcg into the muscle    EPINEPHrine (EPIPEN 2-JUAN CARLOS) 0.3 MG/0.3ML SOAJ injection USE AS DIRECTED AS NEEDED    ergocalciferol (ERGOCALCIFEROL) 1.25 MG (59664 UT) capsule Take 50,000 Units by mouth Twice a Week    estradiol (ESTRACE) 0.5 MG tablet Take 0.5 mg by mouth daily    fluticasone (FLONASE) 50 MCG/ACT nasal spray 2 sprays by Nasal route daily    hydrOXYzine (VISTARIL) 25 MG capsule Take 25 mg by mouth 4 times daily as needed    loratadine (CLARITIN) 10 MG tablet TAKE 1 TABLET BY MOUTH EVERY DAY    pantoprazole (PROTONIX) 20 MG tablet Take 40 mg by mouth daily    valACYclovir (VALTREX) 500 MG tablet Take 500 mg by mouth 2 times daily (Patient not taking: Reported on 6/9/2022)     No current facility-administered medications for this visit. Allergies   Allergen Reactions    Latex Itching    Dimethyl Fumarate Other (See Comments)     Hip pain severe dissipated on d/c Tecfidera.    Palpitations     Metronidazole Shortness Of Breath    Aspirin Swelling    Glatiramer Swelling     Welts after injections.       Past Medical History:   Diagnosis Date    Anxiety     Autoimmune disease ([facility] [address]     MS    Endometriosis     Environmental and seasonal allergies     Family history of colonic polyps     mother    Fever blister     GERD (gastroesophageal reflux disease)     controlled with medication     H/O echocardiogram 2017    EF 55-60%    History of palpitations     r/t medication     Internal hemorrhoids without mention of complication 5331    Multiple sclerosis ([facility] [address]     Followed by Dr. Carl Rangel, no medication at this time (19)    Ovarian cyst     Personal history of colonic polyps     TVA, TA    Rectocele     S/P cataract extraction and insertion of intraocular lens 3/3/2016     Past Surgical History:   Procedure Laterality Date    CATARACT REMOVAL Bilateral     with iol     SECTION      COLONOSCOPY  2014, 2017 -[facility]--1.5 cm sigmoid TVA, 5 small asc TAs--3 year recall;  -- mixed TVA    DILATION AND CURETTAGE OF UTERUS  2019    PELVIC LAPAROSCOPY      laproscopic for endometriosis x14     Family History   Problem Relation Age of Onset    Hypertension Mother     Hypertension Father     Diabetes Father     Heart Disease Father     Elevated Lipids Father     Coronary Art Dis Father         stents x2    Diabetes Brother         uncontrolled    Elevated Lipids Brother     Hypertension Brother     Stroke Brother         also had a brain bleed but unknown cause    Breast Cancer Paternal Aunt     Heart Disease Maternal Grandfather     Stroke Paternal Grandmother     Heart Disease Paternal Grandmother     Breast Cancer Maternal Grandmother 79      Social History     Tobacco Use    Smoking status: Former Smoker     Packs/day: 0.50     Quit date: 2001     Years since quittin.4    Smokeless tobacco: Never Used   Substance Use Topics    Alcohol use: No       Social History     Substance and Sexual Activity   Sexual Activity Not Currently     OB History    Para Term  AB Living   1 1 0 0 0 0   SAB IAB Ectopic Molar Multiple Live Births   0 0 0 0 0 0      # Outcome Date GA Lbr Zan/2nd Weight Sex Delivery Anes PTL Lv   1 Para               Obstetric Comments          Health Maintenance  Mammogram:   Colonoscopy:   Bone Density:    ROS:    Review of Systems  General: Not Present- Chills, Fever, Fatigue, Insomnia, Hot flashes/Night sweats, Weight gain  Skin: Not Present- Bruising, Change in Wart/Mole, Excessive Sweating, Itching, Nail Changes, New Lesions, Rash, Skin Color Changes and Ulcer. HEENT: Not Present- Headache, Blurred Vision, Double Vision, Glaucoma, Visual Disturbances, Hearing Loss, Ringing in the Ears, Vertigo, Nose Bleed, Bleeding Gums, Hoarseness and Sore Throat. Neck: Not Present- Neck Pain and Neck Swelling. Respiratory: Not Present- Cough, Difficulty Breathing and Difficulty Breathing on Exertion. Breast: Not Present- Breast Mass, Breast Pain, Breast Swelling, Nipple Discharge, Nipple Pain, Recent Breast Size Changes and Skin Changes. Cardiovascular: Not Present- Abnormal Blood Pressure, Chest Pain, Edema, Fainting / Blacking Out, Palpitations, Shortness of Breath and Swelling of Extremities. Gastrointestinal: Not Present- Abdominal Pain, Abdominal Swelling, Bloating, Change in Bowel Habits, Constipation, Diarrhea, Difficulty Swallowing, Gets full quickly at meals, Nausea, Rectal Bleeding and Vomiting. Female Genitourinary: Not Present- Dysmenorrhea, Dyspareunia, Decreased libido, Excessive Menstrual Bleeding, Menstrual Irregularities, Pelvic Pain, Urinary Complaints, Vaginal Discharge, Vaginal itching/burning, Vaginal odor  Musculoskeletal: Not Present- Joint Pain and Muscle Pain. Neurological: Not Present- Dizziness, Fainting, Headaches and Seizures. Psychiatric: Not Present- Anxiety, Depression, Mood changes and Panic Attacks. Endocrine: Not Present- Appetite Changes, Cold Intolerance, Excessive Thirst, Excessive Urination and Heat Intolerance.   Hematology: Not Present- Abnormal Bleeding, Easy Bruising and Enlarged Lymph Nodes. PHYSICAL EXAM:    /78   Ht 5' 5\" (1.651 m)   Wt 174 lb (78.9 kg)   BMI 28.96 kg/m²     Constitutional: Vital signs are normal. She appears well-developed and well-nourished. She is active and cooperative. Neurological: She is alert. Nursing note and vitals reviewed. Medical problems and test results were reviewed with the patient today. ASSESSMENT and PLAN    Diagnoses and all orders for this visit:    Menopause syndrome  -     estradiol (VIVELLE-DOT) 0.1 MG/24HR; Place 1 patch onto the skin Twice a Week  -     medroxyPROGESTERone (PROVERA) 2.5 MG tablet; Take 1 tablet by mouth daily          Will get her back for US since she has been on unopposed estrogen. Will try her on Vivelle 0.1 mg with Provera 2.5 mg.  I have stressed the importance that she take the progesterone along with the estrogen patch. Time:  I spent  30 minutes in preparing to see patient (including chart review and preparation), obtaining and/or reviewing additional medical history, performing a physical exam and evaluation, documenting clinical information in the electronic health record, independently interpreting results, communicating results to patient, family or caregiver, and/or coordinating care. No follow-up provider specified.         Candice Mccoy MD

## 2022-06-21 ENCOUNTER — OFFICE VISIT (OUTPATIENT)
Dept: GYNECOLOGY | Age: 57
End: 2022-06-21
Payer: COMMERCIAL

## 2022-06-21 VITALS
WEIGHT: 174 LBS | SYSTOLIC BLOOD PRESSURE: 120 MMHG | DIASTOLIC BLOOD PRESSURE: 78 MMHG | HEIGHT: 65 IN | BODY MASS INDEX: 28.99 KG/M2

## 2022-06-21 DIAGNOSIS — R10.2 PELVIC PAIN IN FEMALE: Primary | ICD-10-CM

## 2022-06-21 DIAGNOSIS — B37.9 YEAST INFECTION: ICD-10-CM

## 2022-06-21 DIAGNOSIS — R93.89 THICKENED ENDOMETRIUM: ICD-10-CM

## 2022-06-21 PROCEDURE — 58100 BIOPSY OF UTERUS LINING: CPT | Performed by: OBSTETRICS & GYNECOLOGY

## 2022-06-21 PROCEDURE — 76830 TRANSVAGINAL US NON-OB: CPT | Performed by: OBSTETRICS & GYNECOLOGY

## 2022-06-21 PROCEDURE — 88305 TISSUE EXAM BY PATHOLOGIST: CPT

## 2022-06-21 PROCEDURE — 99214 OFFICE O/P EST MOD 30 MIN: CPT | Performed by: OBSTETRICS & GYNECOLOGY

## 2022-06-21 RX ORDER — FLUCONAZOLE 150 MG/1
TABLET ORAL
Qty: 2 TABLET | Refills: 1 | Status: SHIPPED | OUTPATIENT
Start: 2022-06-21 | End: 2022-07-11 | Stop reason: ALTCHOICE

## 2022-06-21 RX ORDER — OMEPRAZOLE AND SODIUM BICARBONATE 40; 1100 MG/1; MG/1
1 CAPSULE ORAL
COMMUNITY

## 2022-06-21 RX ORDER — AMOXICILLIN 500 MG/1
CAPSULE ORAL
COMMUNITY
Start: 2022-06-13 | End: 2022-07-11 | Stop reason: ALTCHOICE

## 2022-06-21 NOTE — PROGRESS NOTES
HPI  Chris Wolff is a 61 y.o. female seen for US for follow up history of unopposed estrogen. She has just started progesterone a week ago. Past Medical History, Past Surgical History, Family history, Social History, and Medications were all reviewed with the patient today and updated as necessary. Current Outpatient Medications   Medication Sig    amoxicillin (AMOXIL) 500 MG capsule TAKE 1 CAPSULE BY MOUTH THREE TIMES A DAY    omeprazole-sodium bicarbonate (ZEGERID)  MG per capsule Take 1 capsule by mouth    fluconazole (DIFLUCAN) 150 MG tablet One today and repeat in 4 days    estradiol (VIVELLE-DOT) 0.1 MG/24HR Place 1 patch onto the skin Twice a Week    medroxyPROGESTERone (PROVERA) 2.5 MG tablet Take 1 tablet by mouth daily    atorvastatin (LIPITOR) 10 MG tablet Take 10 mg by mouth daily    cyanocobalamin 1000 MCG/ML injection Inject 1,000 mcg into the muscle    EPINEPHrine (EPIPEN 2-JUAN CARLOS) 0.3 MG/0.3ML SOAJ injection USE AS DIRECTED AS NEEDED    fluticasone (FLONASE) 50 MCG/ACT nasal spray 2 sprays by Nasal route daily    hydrOXYzine (VISTARIL) 25 MG capsule Take 25 mg by mouth 4 times daily as needed    loratadine (CLARITIN) 10 MG tablet TAKE 1 TABLET BY MOUTH EVERY DAY    pantoprazole (PROTONIX) 20 MG tablet Take 40 mg by mouth daily    valACYclovir (VALTREX) 500 MG tablet Take 500 mg by mouth 2 times daily     ergocalciferol (ERGOCALCIFEROL) 1.25 MG (92368 UT) capsule Take 50,000 Units by mouth Twice a Week     No current facility-administered medications for this visit.      Allergies   Allergen Reactions    Latex Itching    Casein Hives    Dimethyl Fumarate Other (See Comments)     Hip pain severe dissipated on d/c Tecfidera.    Palpitations     Interferons Other (See Comments)     Copaxone caused SEIZURES    Metronidazole Shortness Of Breath    Nitroglycerin Itching and Rash     Blisters & skin peeling    Shellfish-Derived Products Hives     Pt has never had a reaction to IV contrast Dye  Pt has never had a reaction to IV contrast Dye      Aspirin Swelling    Glatiramer Swelling     Welts after injections.       Past Medical History:   Diagnosis Date    Anxiety     Autoimmune disease ([facility] [address]     MS    Endometriosis     Environmental and seasonal allergies     Family history of colonic polyps     mother    Fever blister     GERD (gastroesophageal reflux disease)     controlled with medication     H/O echocardiogram 2017    EF 55-60%    History of palpitations     r/t medication     Internal hemorrhoids without mention of complication 2433    Multiple sclerosis ([facility] [address]     Followed by Dr. Talita Noland, no medication at this time (19)    Ovarian cyst     Personal history of colonic polyps     TVA, TA    Rectocele     S/P cataract extraction and insertion of intraocular lens 3/3/2016     Past Surgical History:   Procedure Laterality Date    CATARACT REMOVAL Bilateral     with iol     SECTION      COLONOSCOPY  2014, 2017 -[facility]--1.5 cm sigmoid TVA, 5 small asc TAs--3 year recall;  -- mixed TVA    DILATION AND CURETTAGE OF UTERUS  2019    PELVIC LAPAROSCOPY      laproscopic for endometriosis x14     Family History   Problem Relation Age of Onset    Hypertension Mother     Hypertension Father     Diabetes Father     Heart Disease Father     Elevated Lipids Father     Coronary Art Dis Father         stents x2    Diabetes Brother         uncontrolled    Elevated Lipids Brother     Hypertension Brother     Stroke Brother         also had a brain bleed but unknown cause    Breast Cancer Paternal Aunt     Heart Disease Maternal Grandfather     Stroke Paternal Grandmother     Heart Disease Paternal Grandmother     Breast Cancer Maternal Grandmother 79      Social History     Tobacco Use    Smoking status: Former Smoker     Packs/day: 0.50     Quit date: 2001     Years since quittin.4    Smokeless tobacco: Never Used   Substance Use Topics    Alcohol use: No       Social History     Substance and Sexual Activity   Sexual Activity Not Currently     OB History    Para Term  AB Living   1 1 0 0 0 0   SAB IAB Ectopic Molar Multiple Live Births   0 0 0 0 0 0      # Outcome Date GA Lbr Zan/2nd Weight Sex Delivery Anes PTL Lv   1 Para               Obstetric Comments          Health Maintenance  Mammogram: 20  Colonoscopy: scheduled   Bone Density:    ROS:    Review of Systems  General: Not Present- Chills, Fever, Fatigue, Insomnia, Hot flashes/Night sweats, Weight gain  Skin: Not Present- Bruising, Change in Wart/Mole, Excessive Sweating, Itching, Nail Changes, New Lesions, Rash, Skin Color Changes and Ulcer. HEENT: Not Present- Headache, Blurred Vision, Double Vision, Glaucoma, Visual Disturbances, Hearing Loss, Ringing in the Ears, Vertigo, Nose Bleed, Bleeding Gums, Hoarseness and Sore Throat. Neck: Not Present- Neck Pain and Neck Swelling. Respiratory: Not Present- Cough, Difficulty Breathing and Difficulty Breathing on Exertion. Breast: Not Present- Breast Mass, Breast Pain, Breast Swelling, Nipple Discharge, Nipple Pain, Recent Breast Size Changes and Skin Changes. Cardiovascular: Not Present- Abnormal Blood Pressure, Chest Pain, Edema, Fainting / Blacking Out, Palpitations, Shortness of Breath and Swelling of Extremities. Gastrointestinal: Not Present- Abdominal Pain, Abdominal Swelling, Bloating, Change in Bowel Habits, Constipation, Diarrhea, Difficulty Swallowing, Gets full quickly at meals, Nausea, Rectal Bleeding and Vomiting. Female Genitourinary: Not Present- Dysmenorrhea, Dyspareunia, Decreased libido, Excessive Menstrual Bleeding, Menstrual Irregularities, Pelvic Pain, Urinary Complaints, Vaginal Discharge, Vaginal itching/burning, Vaginal odor  Musculoskeletal: Not Present- Joint Pain and Muscle Pain.   Neurological: Not Present- Dizziness, Fainting, Headaches and Seizures. Psychiatric: Not Present- Anxiety, Depression, Mood changes and Panic Attacks. Endocrine: Not Present- Appetite Changes, Cold Intolerance, Excessive Thirst, Excessive Urination and Heat Intolerance. Hematology: Not Present- Abnormal Bleeding, Easy Bruising and Enlarged Lymph Nodes. PHYSICAL EXAM:    /78 (Position: Sitting)   Ht 5' 5\" (1.651 m)   Wt 174 lb (78.9 kg)   BMI 28.96 kg/m²     Physical Exam   General   Mental Status - Alert. General Appearance - Cooperative. Abdomen   Inspection: - Inspection Normal.   Palpation/Percussion: Palpation and Percussion of the abdomen reveal - Non Tender, No Rebound tenderness, No Rigidity (guarding), No hepatosplenomegaly, No Palpable abdominal masses and Soft. Auscultation: Auscultation of the abdomen reveals - Bowel sounds normal.     Female Genitourinary     External Genitalia   Vulva: - Normal. Perineum - Normal. Bartholin's Gland - Bilateral - Normal. Clitoris - Normal.   Introitus: Characteristics - Normal.   Urethra: Characteristics - Normal.     Speculum & Bimanual   Vagina: Vaginal Mucosa - Normal.   Vaginal Wall: - Normal.   Vaginal Lesions - None. Cervix: Characteristics - Normal.   Uterus: Characteristics - Normal.   Adnexa: - Normal.   Bladder - Normal.     Lymphatics  No cervical, axillary or groin adenopathy          Medical problems and test results were reviewed with the patient today. ASSESSMENT and PLAN    Tray Mendez was seen today for ultrasound. Diagnoses and all orders for this visit:    Pelvic pain in female  -     US NON OB TRANSVAGINAL    Thickened endometrium  -     TN BIOPSY OF UTERUS LINING  -     STF Surgical Pathology    Yeast infection  -     fluconazole (DIFLUCAN) 150 MG tablet;  One today and repeat in 4 days      Comment   [id number]----cjlysb pain   HISTORY: Low pelvic pain   COMPARISON: Ultrasound 10/7/21---5wks and endo = 2.3mm   Enlarged uterus = 5wks   Endometrium = 5.7mm and appears slightly thickened   Rt ovary is normal.   Lt ovary is normal.   No free fluid noted in the pelvis. Date: 06/21/2022 Perf. Physician: Dr. Vernon Elder MD Sonographer: Dominga Newberry, [address] [place] [address] done in my office and discussed with patient. She will return in 3 months to follow up endometrial thickening    ENDOMETRIAL BIOPSY  Speculum inserted. Cervix visualized. Anterior lip of cervix grasped with single tooth tenaculum. Pipelle inserted into uterus and tissue biopsy obtained x 2    Time:  I spent  30 minutes in preparing to see patient (including chart review and preparation), obtaining and/or reviewing additional medical history, performing a physical exam and evaluation, documenting clinical information in the electronic health record, independently interpreting results, communicating results to patient, family or caregiver, and/or coordinating care. No follow-up provider specified.         Sandy Salazar MD

## 2022-06-22 ENCOUNTER — HOSPITAL ENCOUNTER (OUTPATIENT)
Dept: LAB | Age: 57
Discharge: HOME | End: 2022-06-25
Payer: COMMERCIAL

## 2022-07-11 ENCOUNTER — OFFICE VISIT (OUTPATIENT)
Dept: NEUROLOGY | Age: 57
End: 2022-07-11
Payer: COMMERCIAL

## 2022-07-11 VITALS
BODY MASS INDEX: 29.49 KG/M2 | DIASTOLIC BLOOD PRESSURE: 75 MMHG | WEIGHT: 177 LBS | SYSTOLIC BLOOD PRESSURE: 116 MMHG | HEIGHT: 65 IN | HEART RATE: 72 BPM

## 2022-07-11 DIAGNOSIS — Z87.898 HX OF IDIOPATHIC SEIZURE: ICD-10-CM

## 2022-07-11 DIAGNOSIS — Z79.899 ENCOUNTER FOR MEDICATION MANAGEMENT: ICD-10-CM

## 2022-07-11 DIAGNOSIS — R25.1 TREMOR: ICD-10-CM

## 2022-07-11 DIAGNOSIS — G35 RELAPSING REMITTING MULTIPLE SCLEROSIS (HCC): Primary | ICD-10-CM

## 2022-07-11 DIAGNOSIS — R20.2 PARESTHESIA: ICD-10-CM

## 2022-07-11 DIAGNOSIS — F40.240 CLAUSTROPHOBIA: ICD-10-CM

## 2022-07-11 PROBLEM — F41.9 ANXIETY: Status: RESOLVED | Noted: 2021-09-07 | Resolved: 2022-07-11

## 2022-07-11 PROBLEM — T50.905A MEDICATION SIDE EFFECT, INITIAL ENCOUNTER: Status: RESOLVED | Noted: 2018-04-09 | Resolved: 2022-07-11

## 2022-07-11 PROCEDURE — 99215 OFFICE O/P EST HI 40 MIN: CPT | Performed by: PSYCHIATRY & NEUROLOGY

## 2022-07-11 RX ORDER — GABAPENTIN 100 MG/1
100 CAPSULE ORAL 4 TIMES DAILY
Qty: 120 CAPSULE | Refills: 5 | Status: SHIPPED | OUTPATIENT
Start: 2022-07-11 | End: 2023-01-07

## 2022-07-11 RX ORDER — ALPRAZOLAM 0.5 MG/1
1 TABLET ORAL
Qty: 2 TABLET | Refills: 0 | Status: SHIPPED | OUTPATIENT
Start: 2022-07-11 | End: 2022-09-22

## 2022-07-11 RX ORDER — ESTRADIOL 0.1 MG/D
1 FILM, EXTENDED RELEASE TRANSDERMAL
COMMUNITY
End: 2022-08-19

## 2022-07-11 ASSESSMENT — ENCOUNTER SYMPTOMS
BACK PAIN: 1
EYES NEGATIVE: 1
RESPIRATORY NEGATIVE: 1
GASTROINTESTINAL NEGATIVE: 1

## 2022-07-11 ASSESSMENT — VISUAL ACUITY: OU: 1

## 2022-07-11 NOTE — PATIENT INSTRUCTIONS
Patient Education        Essential Tremor: Care Instructions  Overview     Essential tremor is a medical term for shaking that you can't control. Your hand or fingers may shake when you lift a cup or point at something. Or your voice may shake when you speak. It is not related to a stroke or Parkinson'sdisease. Some things can affect how much you shake. For example, anxiety may make tremors worse. Some medicines also can increase tremors. These include antidepressants and too much thyroid replacement. Talk to your doctor if youthink one of your medicines makes your tremors worse. If your tremors bother you, there are some things you can do to reduce them ormake them less noticeable. This includes taking medicine. Follow-up care is a key part of your treatment and safety. Be sure to make and go to all appointments, and call your doctor if you are having problems. It's also a good idea to know your test results and keep alist of the medicines you take. How can you care for yourself at home?  Take your medicines exactly as prescribed. Call your doctor if you think you are having a problem with your medicine. Some medicines that help control tremors have to be taken every day, even if you are not having tremors. You will get more details on the specific medicines your doctor prescribes.  Get plenty of rest.   Eat a balanced, healthy diet.  Try to reduce stress. Regular exercise and massages may help.  Avoid drinks or foods with caffeine if they make your tremors worse. These include tea, cola, coffee, and chocolate.  Wear a heavy bracelet or watch. This adds a little weight to your hand. The extra weight may reduce tremors.  Drink from cups or glasses that are only half full. You may also want to try drinking with a straw. When should you call for help?   Watch closely for changes in your health, and be sure to contact your doctor if:     You notice your tremors are getting worse.      You can't do your everyday activities because of your tremors.      You are bothered by your tremors. Where can you learn more? Go to [email or web address] and sign in to your [employer] account. Enter B746 in the [employer] box to learn more about \"Essential Tremor: Care Instructions. \"     If you do not have an account, please click on the \"Sign Up Now\" link. Current as of: December 13, 2021               Content Version: 13.3  © 2006-2022 [employer]. Care instructions adapted under license by [facility] ([facility]). If you have questions about a medical condition or this instruction, always ask your healthcare professional. [place] [address] any warranty or liability for your use of this information. Patient Education        Essential Tremor: Care Instructions  Overview     Essential tremor is a medical term for shaking that you can't control. Your hand or fingers may shake when you lift a cup or point at something. Or your voice may shake when you speak. It is not related to a stroke or Parkinson'sdisease. Some things can affect how much you shake. For example, anxiety may make tremors worse. Some medicines also can increase tremors. These include antidepressants and too much thyroid replacement. Talk to your doctor if youthink one of your medicines makes your tremors worse. If your tremors bother you, there are some things you can do to reduce them ormake them less noticeable. This includes taking medicine. Follow-up care is a key part of your treatment and safety. Be sure to make and go to all appointments, and call your doctor if you are having problems. It's also a good idea to know your test results and keep alist of the medicines you take. How can you care for yourself at home?  Take your medicines exactly as prescribed. Call your doctor if you think you are having a problem with your medicine.  Some medicines that help control tremors have to be breathing. Some people have thoughts about suicide or behavior changes while taking gabapentin. Stay alert to changes in your mood or symptoms. Report any new or worsening symptoms to your doctor. Do not stop using gabapentin suddenly, even if you feel fine. What is gabapentin? Gabapentin is used together with other medicines to treat partial seizures inadults and children at least 1years old. Gabapentin is also used to treat nerve pain caused by herpes virus or shingles(herpes zoster) in adults. Use only the brand and form of gabapentin your doctor has prescribed. Check your medicine each time you get a refill to make sure you receive thecorrect form. Gralise is used only to treat nerve pain. Horizant is used to treat nerve pain and restless legs syndrome (RLS). Neurontin is used to treat nerve pain and seizures. Gabapentin may also be used for purposes not listed in this medication guide. What should I discuss with my healthcare provider before taking gabapentin? You should not use gabapentin if you are allergic to it. Tell your doctor if you have ever had:   breathing problems or lung disease, such as chronic obstructive pulmonary disease (COPD);   kidney disease (or if you are on dialysis);   diabetes;   depression, a mood disorder, or suicidal thoughts or actions;   a drug addiction;   a seizure (unless you take gabapentin to treat seizures);   liver disease;   heart disease; or   (for patients with RLS) if you are a day sleeper or work a night shift. Some people have thoughts about suicide while taking this medicine. Children taking gabapentin may have behavior changes. Stay alert to changes in your mood or symptoms. Report any new or worsening symptoms to your doctor. It is not known whether this medicine will harm an unborn baby. Tell yourdoctor if you are pregnant or plan to become pregnant.   Seizure control is very important during pregnancy, and having a seizure could harm both mother and baby. Do not start or stop taking gabapentin for seizures without your doctor's advice, and tell your doctor right away if you become pregnant. If you are pregnant, your name may be listed on a pregnancy registry to trackthe effects of gabapentin on the baby. It may not be safe to breastfeed while using this medicine. Ask your doctor about any risk. How should I take gabapentin? Follow all directions on your prescription label. Do not take this medicine inlarger or smaller amounts or for longer than recommended. If your doctor changes your brand, strength, or type of gabapentin, your dosage needs may change. Ask your pharmacist if you have any questions about the new kind of gabapentinyou receive at the pharmacy. Both Gralise and Horizant should be taken with food. Neurontin can be taken with or without food. If you break a Neurontin tablet and take only half of it, take the other half at your next dose. Any tablet that has been broken should be used as soon as possible or within a fewdays. Swallow the capsule  or tablet whole and do not crush, chew, break, or open it. Measure liquid medicine carefully. Use the dosing syringe provided, or use a medicine dose-measuringdevice (not a kitchen spoon). Do not stop using gabapentin suddenly, even if you feel fine. Stopping suddenly may cause increased seizures. Followyour doctor's instructions about tapering your dose. In case of emergency, wear or carry medical identification to let others knowyou have seizures. This medicine can cause unusual results with certain medical tests. Tell anydoctor who treats you that you are using gabapentin. Store gabapentin tablets and capsules at room temperature away from light and moisture. Store the liquid medicine in the refrigerator. Do not freeze. What happens if I miss a dose? Take the medicine as soon as you can, but skip the missed dose if it is almost time for your next dose.  Do not take two doses at one time. If you take Horizant:  Skip the missed dose and use your next dose at the regular time. Do not use two doses of Horizant at one time. What happens if I overdose? Seek emergency medical attention or call the Poison Help line at 1-901.639.3154. What should I avoid while taking gabapentin? Avoid driving or hazardous activity until you know how this medicine will affect you. Your reactions could be impaired. Dizziness or drowsiness can cause falls, accidents, or severe injuries. Avoid taking an antacid within 2 hours before you take gabapentin. Antacids canmake it harder for your body to absorb gabapentin. Avoid drinking alcohol while taking gabapentin. What are the possible side effects of gabapentin? Get emergency medical help if you have signs of an allergic reaction: hives; difficult breathing; swelling of your face, lips, tongue, or throat. Seek medical treatment if you have a serious drug reaction that can affect many parts of your body. Symptoms may include: skin rash, fever, swollen glands, muscle aches, severe weakness, unusual bruising, upper stomach pain, or yellowing of your skin oreyes. Report any new or worsening symptoms to your doctor, such as: mood or behavior changes, anxiety, panic attacks, trouble sleeping, or if you feel impulsive, irritable, agitated, hostile, aggressive, restless, hyperactive (mentally or physically), depressed, or have thoughts about suicideor hurting yourself. Call your doctor at once if you have:   weak or shallow breathing;   blue-colored skin, lips, fingers, and toes;   confusion, extreme drowsiness or weakness;   problems with balance or muscle movement;   unusual or involuntary eye movements; or   increased seizures. Gabapentin can cause life-threatening breathing problems. A person caring for you should seek emergency medical attention if you have slow breathing with long pauses, blue colored lips, or if you are hard to wake up.  Breathing problems may be more likely in older adults or in people withCOPD. Some side effects are more likely in children taking gabapentin. Contact your doctor if the child taking this medicine has any of the followingside effects:   changes in behavior;   memory problems;   trouble concentrating; or   acting restless, hostile, or aggressive. Common side effects may include:   fever, chills, sore throat, body aches, unusual tiredness;   jerky movements;   headache;   double vision;   swelling of your legs and feet;   tremors;   trouble speaking;   dizziness, drowsiness, tiredness;   problems with balance or eye movements; or   nausea, vomiting. This is not a complete list of side effects and others may occur. Call your doctor for medical advice about side effects. You may report side effects toFDA at 2-451-LVA-3806. What other drugs will affect gabapentin? Using gabapentin with other drugs that make you drowsy or slow your breathing can cause dangerous side effects or death. Ask your doctor before using opioid medication, a sleeping pill, cold orallergy medicine, a muscle relaxer, or medicine for anxiety or seizures. Other drugs may affect gabapentin, including prescription and over-the-counter medicines, vitamins, and herbal products. Tell your doctor about all yourcurrent medicines and any medicine you start or stop using. Where can I get more information? Your pharmacist can provide more information about gabapentin. Remember, keep this and all other medicines out of the reach of children, never share your medicines with others, and use this medication only for the indication prescribed. Every effort has been made to ensure that the information provided by [address] is accurate, up-to-date, and complete, but no guarantee is made to that effect. Drug information contained herein may be time sensitive.  [employer] information has been compiled for use by healthcare practitioners and consumers in the [place] and therefore [employer] does not warrant that uses outside of the [place] are appropriate, unless specifically indicated otherwise. [facility] drug information does not endorse drugs, diagnose patients or recommend therapy. [employer] drug information is an informational resource designed to assist licensed healthcare practitioners in caring for their patients and/or to serve consumers viewing this service as a supplement to, and not a substitute for, the expertise, skill, knowledge and judgment of healthcare practitioners. The absence of a warning for a given drug or drug combination in no way should be construed to indicate that the drug or drug combination is safe, effective or appropriate for any given patient. [facility] does not assume any responsibility for any aspect of healthcare administered with the aid of information [facility] provides. The information contained herein is not intended to cover all possible uses, directions, precautions, warnings, drug interactions, allergic reactions, or adverse effects. If you have questions about the drugs you are taking, check with yourdoctor, nurse or pharmacist.  Copyright 8121-1907 [address]: 17.01. Revision date:1/26/2021. Care instructions adapted under license by [facility] ([facility]). If you have questions about a medical condition or this instruction, always ask your healthcare professional. [place] [address] any warranty or liability for your use of this information.

## 2022-07-11 NOTE — PROGRESS NOTES
NEUROLOGY  RETURN  OFFICE VISIT [de-identified]                     2022  Matthew Omalley is a 61 y.o. female here for [de-identified]    MS and tremors. .. Referred by     CHRISTOPHER Maloney NP     Chief Complaint:   Chief Complaint   Patient presents with    Follow-up    Multiple Sclerosis     Unaccompanied>>       60 yo WF     Now noted a tremors in hands - mentioned by daughter. . sub-clinical.     Now 1.3 yr since last MRI scan. .. stable >>>   But tremors noted with minimal other symptoms--      Multi allergic. Albino Roger discussed at length any problems w dye// specifically gadolinium. Albino Roger which she denies. Active Problems:    * No active hospital problems. *  Resolved Problems:    * No resolved hospital problems.  *    Past Medical History:   Diagnosis Date    Anxiety     Autoimmune disease ([address]     MS    Endometriosis     Environmental and seasonal allergies     Family history of colonic polyps     mother    Fever blister     GERD (gastroesophageal reflux disease)     controlled with medication     H/O echocardiogram 2017    EF 55-60%    History of palpitations     r/t medication     Internal hemorrhoids without mention of complication 4014    Multiple sclerosis ([address]     Followed by Dr. Echo Mantilla, no medication at this time (19)    Ovarian cyst     Personal history of colonic polyps     TVA, TA    Rectocele     S/P cataract extraction and insertion of intraocular lens 3/3/2016     Past Surgical History:   Procedure Laterality Date    CATARACT REMOVAL Bilateral     with iol     SECTION      COLONOSCOPY  2014, 2017     Mari--1.5 cm sigmoid TVA, 5 small asc TAs--3 year recall;  -- mixed TVA    DILATION AND CURETTAGE OF UTERUS  2019    PELVIC LAPAROSCOPY      laproscopic for endometriosis x14      Family History   Problem Relation Age of Onset    Hypertension Mother     Hypertension Father     Diabetes Father     Heart Disease Father     Elevated Lipids Father  Coronary Art Dis Father         stents x2    Diabetes Brother         uncontrolled    Elevated Lipids Brother     Hypertension Brother     Stroke Brother         also had a brain bleed but unknown cause    Breast Cancer Paternal Aunt     Heart Disease Maternal Grandfather     Stroke Paternal Grandmother     Heart Disease Paternal Grandmother     Breast Cancer Maternal Grandmother 79     Social History     Socioeconomic History    Marital status:      Spouse name: None    Number of children: None    Years of education: None    Highest education level: None   Occupational History    None   Tobacco Use    Smoking status: Former Smoker     Packs/day: 0.50     Quit date: 2001     Years since quittin.5    Smokeless tobacco: Never Used   Substance and Sexual Activity    Alcohol use: No    Drug use: No    Sexual activity: Not Currently   Other Topics Concern    None   Social History Narrative    Abuse: Feels safe at home, no history of physical abuse, no history of sexual abuse     Social Determinants of Health     Financial Resource Strain:     Difficulty of Paying Living Expenses: Not on file   Food Insecurity:     Worried About Running Out of Food in the Last Year: Not on file    Jenny of Food in the Last Year: Not on file   Transportation Needs:     Lack of Transportation (Medical): Not on file    Lack of Transportation (Non-Medical):  Not on file   Physical Activity:     Days of Exercise per Week: Not on file    Minutes of Exercise per Session: Not on file   Stress:     Feeling of Stress : Not on file   Social Connections:     Frequency of Communication with Friends and Family: Not on file    Frequency of Social Gatherings with Friends and Family: Not on file    Attends Muslim Services: Not on file    Active Member of Clubs or Organizations: Not on file    Attends Club or Organization Meetings: Not on file    Marital Status: Not on file   Intimate Partner Violence:     Fear of Current or Ex-Partner: Not on file    Emotionally Abused: Not on file    Physically Abused: Not on file    Sexually Abused: Not on file   Housing Stability:     Unable to Pay for Housing in the Last Year: Not on file    Number of Places Lived in the Last Year: Not on file    Unstable Housing in the Last Year: Not on file        Current Outpatient Medications   Medication Sig Dispense Refill    estradiol (VIVELLE) 0.1 MG/24HR Place 1 patch onto the skin Twice a Week      gabapentin (NEURONTIN) 100 MG capsule Take 1 capsule by mouth 4 times daily for 180 days. Intended supply: 30 days 120 capsule 5    ALPRAZolam (XANAX) 0.5 MG tablet Take 2 tablets by mouth once as needed for Anxiety (MRI) for up to 1 dose. 2 tablet 0    omeprazole-sodium bicarbonate (ZEGERID)  MG per capsule Take 1 capsule by mouth      atorvastatin (LIPITOR) 10 MG tablet Take 10 mg by mouth daily      cyanocobalamin 1000 MCG/ML injection Inject 1,000 mcg into the muscle      EPINEPHrine (EPIPEN 2-JUAN CARLOS) 0.3 MG/0.3ML SOAJ injection USE AS DIRECTED AS NEEDED      ergocalciferol (ERGOCALCIFEROL) 1.25 MG (09439 UT) capsule Take 50,000 Units by mouth Twice a Week      loratadine (CLARITIN) 10 MG tablet TAKE 1 TABLET BY MOUTH EVERY DAY       No current facility-administered medications for this visit. Allergies   Allergen Reactions    Latex Itching    Casein Hives    Dimethyl Fumarate Other (See Comments)     Hip pain severe dissipated on d/c Tecfidera.    Palpitations     Interferons Other (See Comments)     Copaxone caused SEIZURES    Metronidazole Shortness Of Breath    Nitroglycerin Itching and Rash     Blisters & skin peeling    Shellfish-Derived Products Hives     Pt has never had a reaction to IV contrast Dye  Pt has never had a reaction to IV contrast Dye      Aspirin Swelling       REVIEW OTHER RECORDS:    Review of Systems   Constitutional: Negative. HENT: Negative. Eyes: Negative. Respiratory: Negative. Cardiovascular: Negative. Gastrointestinal: Negative. Genitourinary: Negative. Musculoskeletal: Positive for back pain and neck pain. Skin: Negative. Neurological: Positive for dizziness, tingling, tremors and sensory change. Negative for speech change, focal weakness, seizures, loss of consciousness, weakness and headaches. Endo/Heme/Allergies: Negative. Psychiatric/Behavioral: The patient has insomnia. All other systems reviewed and are negative. REVIEW IMAGING: last MRI brain     Objective:     Vitals:    07/11/22 0851   BP: 116/75   Site: Left Upper Arm   Position: Sitting   Pulse: 72   Weight: 177 lb (80.3 kg)   Height: 5' 5\" (1.651 m)        Physical Exam  Vitals reviewed. Constitutional:       General: She is awake. She is not in acute distress. Appearance: She is well-developed and well-groomed. She is not ill-appearing, toxic-appearing or diaphoretic. HENT:      Head: Normocephalic and atraumatic. No raccoon eyes, abrasion, contusion, right periorbital erythema or left periorbital erythema. Right Ear: Hearing normal.      Left Ear: Hearing normal.   Eyes:      General: Lids are normal. Vision grossly intact. No visual field deficit or scleral icterus. Right eye: No discharge. Left eye: No discharge. Extraocular Movements: Extraocular movements intact. Right eye: Normal extraocular motion and no nystagmus. Left eye: Normal extraocular motion and no nystagmus. Conjunctiva/sclera: Conjunctivae normal.      Right eye: Right conjunctiva is not injected. Left eye: Left conjunctiva is not injected. Pupils: Pupils are equal, round, and reactive to light. Comments: Good EOM. No FAMILIA. Neck:      Trachea: Phonation normal.   Pulmonary:      Effort: Pulmonary effort is normal. No respiratory distress. Breath sounds: No wheezing.    Musculoskeletal:         General: No swelling or deformity. Normal range of motion. Cervical back: Normal range of motion and neck supple. No signs of trauma or torticollis. Right lower leg: No edema. Left lower leg: No edema. Skin:     General: Skin is warm and dry. Capillary Refill: Capillary refill takes less than 2 seconds. Coloration: Skin is not cyanotic, jaundiced or pale. Findings: No bruising. Nails: There is no clubbing. Neurological:      Mental Status: She is alert and easily aroused. Mental status is at baseline. Cranial Nerves: Cranial nerves are intact. No cranial nerve deficit, dysarthria or facial asymmetry. Sensory: Sensation is intact. No sensory deficit. Motor: No weakness, tremor, atrophy, abnormal muscle tone or seizure activity. Coordination: Coordination is intact. Coordination normal.      Gait: Gait is intact. Gait normal.   Psychiatric:         Attention and Perception: Attention normal.         Mood and Affect: Mood normal.         Speech: Speech normal.         Behavior: Behavior normal. Behavior is cooperative. Neurologic Exam     Mental Status   Speech: speech is normal   Level of consciousness: alert  Knowledge: good and consistent with education. Normal comprehension. Cranial Nerves     CN III, IV, VI   Pupils are equal, round, and reactive to light. Gait, Coordination, and Reflexes     Gait  Gait: normal    Tremor   Resting tremor: absent  Intention tremor: absent  Action tremor: absent    Reflexes   Right Maharaj: absent  Left Maharaj: absent    Essential tremors fingers etc.     Assessment & Plan     Jackelin Rodriguez was seen today for follow-up and multiple sclerosis. Diagnoses and all orders for this visit:    Relapsing remitting multiple sclerosis ([facility] [address] [facility]  -     [address]; Future    Tremor  -     gabapentin (NEURONTIN) 100 MG capsule; Take 1 capsule by mouth 4 times daily for 180 days.  Intended supply: 30 days    Encounter for medication management    Paresthesia    Hx of idiopathic seizure    Claustrophobia  -     ALPRAZolam (XANAX) 0.5 MG tablet; Take 2 tablets by mouth once as needed for Anxiety (MRI) for up to 1 dose. 1.  Tremors - likely benign fam type -- trial gabapentin. 2.  Time for repeat comparison MRI brain scan -- though presently she is not taking any DMT. 3.  Prev prob w tecfidera. 4.  No new. All drugs and rx reviewed fully with patient and acknowledged spcific to neurology as well. Differential diagnostic decisions and thoughts reviewed and discussed. Updating to ID pt, coordinate neurology visits, reasons for follow, review neurologic problems. Extensive time[de-identified]  Total time  41 minutes -       More than 50% of this visit was spent in counseling and care coordination. Treatment options fully reviewed with patient. Time includes pre-  and post- face-face time in records review, and preparation including available pertinent images and reports. Acknowledgements obtained where needed.    [ *NOTE: parts of this note were produced using artificial speech recognition software, which may have inadvertent errors in the produced wordings. ]          Barbara Aj MD  Consultative Neurology, [facility]   [facility] [address] [place] [address]  Phone:  [phone]  Fax:   [phone]        Signed By: Barbara Aj MD     July 11, 2022

## 2022-07-14 ENCOUNTER — TELEPHONE (OUTPATIENT)
Dept: GYNECOLOGY | Age: 57
End: 2022-07-14

## 2022-07-14 NOTE — TELEPHONE ENCOUNTER
Pt notified and she will stop hormones. She will follow up with U/S that is scheduled. She will call with problems.

## 2022-07-14 NOTE — TELEPHONE ENCOUNTER
She is scheduled for repeat U/S in September for thickened endometrium. If she needs to continue hormones, she would like to do something else.

## 2022-08-15 ENCOUNTER — TELEPHONE (OUTPATIENT)
Dept: INTERNAL MEDICINE CLINIC | Facility: CLINIC | Age: 57
End: 2022-08-15

## 2022-08-15 DIAGNOSIS — E78.00 PURE HYPERCHOLESTEROLEMIA: Primary | ICD-10-CM

## 2022-08-15 NOTE — TELEPHONE ENCOUNTER
----- Message from Kadiedwayne Cook MA sent at 8/15/2022  8:56 AM EDT -----  Subject: Message to Provider    QUESTIONS  Information for Provider? HAs appt 9/2/22. Requesting new labs prior to   appt. She was not on cholesterol meds at the time. Please call patient.   ---------------------------------------------------------------------------  --------------  Tam REMYHARRIET  [phone]; OK to leave message on voicemail  ---------------------------------------------------------------------------  --------------  SCRIPT ANSWERS  Relationship to Patient?  Self

## 2022-08-16 ENCOUNTER — TELEPHONE (OUTPATIENT)
Dept: CARDIOLOGY CLINIC | Age: 57
End: 2022-08-16

## 2022-08-17 ENCOUNTER — TELEPHONE (OUTPATIENT)
Dept: CARDIOLOGY CLINIC | Age: 57
End: 2022-08-17

## 2022-08-17 NOTE — TELEPHONE ENCOUNTER
Pt called yesterday with c/o generalized muscle pain as well as chest discomfort. PCP started on atorvastatin 3 months ago. Pt also has MS. Instructed pt to call PCP regarding statin and was told statins dont cause \"this kind of muscle pain. \" Went to [facility] ER. Normal cardiac workup. BP in /90. Asking what to do. Next OV scheduled 8/24, new to provider. Has seen Dr. Domitila Valdes in the past. Has PCP f/u on Friday. Asking what she should do for her BP. Spoke to pt. BP this am 152/92. C/o h/a. Explained that her BP today is not high enough to be causing h/a. Pt states ER told her to hold her atorvastatin and see if that helps. Recommend hydration to 64 oz/day, minimize salt, and monitor BP once daily around lunch time and keeping a log. Bring to OV on 8/24. Please advise further recommendations.

## 2022-08-17 NOTE — TELEPHONE ENCOUNTER
Went to urgent care per our suggestion yesterday and they sent her to [address]. BP was 198/90 and she was there several hours but they gave her no RX . She is down to see Dr Elvis Cortez next Wed but what should she do until then? She has seen Dr Izzy Richards here before. Please call

## 2022-08-19 ENCOUNTER — OFFICE VISIT (OUTPATIENT)
Dept: INTERNAL MEDICINE CLINIC | Facility: CLINIC | Age: 57
End: 2022-08-19
Payer: COMMERCIAL

## 2022-08-19 VITALS
TEMPERATURE: 97.7 F | OXYGEN SATURATION: 98 % | DIASTOLIC BLOOD PRESSURE: 84 MMHG | HEART RATE: 79 BPM | HEIGHT: 65 IN | SYSTOLIC BLOOD PRESSURE: 146 MMHG | WEIGHT: 174.4 LBS | BODY MASS INDEX: 29.06 KG/M2

## 2022-08-19 DIAGNOSIS — R06.81 WITNESSED APNEIC SPELLS: Primary | ICD-10-CM

## 2022-08-19 DIAGNOSIS — K21.9 GASTROESOPHAGEAL REFLUX DISEASE, UNSPECIFIED WHETHER ESOPHAGITIS PRESENT: ICD-10-CM

## 2022-08-19 DIAGNOSIS — M79.10 MUSCLE PAIN: ICD-10-CM

## 2022-08-19 DIAGNOSIS — R03.0 ELEVATED BP WITHOUT DIAGNOSIS OF HYPERTENSION: ICD-10-CM

## 2022-08-19 DIAGNOSIS — R93.1 ELEVATED CORONARY ARTERY CALCIUM SCORE: ICD-10-CM

## 2022-08-19 DIAGNOSIS — K44.9 HIATAL HERNIA: ICD-10-CM

## 2022-08-19 DIAGNOSIS — E78.00 PURE HYPERCHOLESTEROLEMIA: ICD-10-CM

## 2022-08-19 LAB
ALBUMIN SERPL-MCNC: 4.3 G/DL (ref 3.5–5)
ALBUMIN/GLOB SERPL: 1.2 {RATIO} (ref 1.2–3.5)
ALP SERPL-CCNC: 62 U/L (ref 50–136)
ALT SERPL-CCNC: 31 U/L (ref 12–65)
ANION GAP SERPL CALC-SCNC: 8 MMOL/L (ref 7–16)
AST SERPL-CCNC: 20 U/L (ref 15–37)
BILIRUB SERPL-MCNC: 0.7 MG/DL (ref 0.2–1.1)
BUN SERPL-MCNC: 12 MG/DL (ref 6–23)
CALCIUM SERPL-MCNC: 9.7 MG/DL (ref 8.3–10.4)
CHLORIDE SERPL-SCNC: 106 MMOL/L (ref 98–107)
CHOLEST SERPL-MCNC: 177 MG/DL
CO2 SERPL-SCNC: 24 MMOL/L (ref 21–32)
CREAT SERPL-MCNC: 0.9 MG/DL (ref 0.6–1)
ERYTHROCYTE [SEDIMENTATION RATE] IN BLOOD: 23 MM/HR (ref 0–30)
GLOBULIN SER CALC-MCNC: 3.7 G/DL (ref 2.3–3.5)
GLUCOSE SERPL-MCNC: 85 MG/DL (ref 65–100)
HDLC SERPL-MCNC: 64 MG/DL (ref 40–60)
HDLC SERPL: 2.8 {RATIO}
LDLC SERPL CALC-MCNC: 93.6 MG/DL
MYOGLOBIN SERPL-MCNC: 28 NG/ML (ref 9–82)
POTASSIUM SERPL-SCNC: 4.5 MMOL/L (ref 3.5–5.1)
PROT SERPL-MCNC: 8 G/DL (ref 6.3–8.2)
SODIUM SERPL-SCNC: 138 MMOL/L (ref 136–145)
TRIGL SERPL-MCNC: 97 MG/DL (ref 35–150)
VLDLC SERPL CALC-MCNC: 19.4 MG/DL (ref 6–23)

## 2022-08-19 PROCEDURE — 99213 OFFICE O/P EST LOW 20 MIN: CPT | Performed by: NURSE PRACTITIONER

## 2022-08-19 ASSESSMENT — PATIENT HEALTH QUESTIONNAIRE - PHQ9
1. LITTLE INTEREST OR PLEASURE IN DOING THINGS: 1
SUM OF ALL RESPONSES TO PHQ QUESTIONS 1-9: 1
SUM OF ALL RESPONSES TO PHQ9 QUESTIONS 1 & 2: 1
2. FEELING DOWN, DEPRESSED OR HOPELESS: 0
SUM OF ALL RESPONSES TO PHQ QUESTIONS 1-9: 1

## 2022-08-19 NOTE — PROGRESS NOTES
40M with epigastric pain that began yesterday. Became severe and he went to urgent care. From there he was referred to ED. He vomited once in the ER. His pain slowly began to improve. Denies fevers but does have dark urine. No jaundice. No fevers.   PROGRESS NOTE    SUBJECTIVE:   Saba Jansen is a 61 y.o. female seen for a follow up visit for   Chief Complaint   Patient presents with    Other     Urgent care follow up     HPI  On 8/15/2022 Didn't feel right. Shoulders aching in joint. Felt tight like wearing tight bra. Thought it might acid reflux. First to urgent care [facility] in [facility]. EKG and CXR and sent to  ER went to [facility].  [facility] /98. Not treated there. Told to stop atorvastatin  133/81 yesterday morning  121/82 this morning with wrist cuff       Reviewed and updated this visit by provider:  Tobacco  Allergies  Meds  Problems  Med Hx  Surg Hx  Fam Hx         Review of Systems     OBJECTIVE:    BP (!) 154/84 (Site: Left Upper Arm, Position: Sitting, Cuff Size: Large Adult)   Pulse 79   Temp 97.7 °F (36.5 °C) (Temporal)   Ht 5' 5\" (1.651 m)   Wt 174 lb 6.4 oz (79.1 kg)   SpO2 98%   BMI 29.02 kg/m²      Physical Exam  Vitals and nursing note reviewed. Constitutional:       Appearance: Normal appearance. She is well-groomed. HENT:      Head: Normocephalic. Right Ear: Hearing and external ear normal.      Left Ear: Hearing and external ear normal.      Mouth/Throat:      Lips: Pink. Mouth: Mucous membranes are moist.   Eyes:      General: Lids are normal.      Conjunctiva/sclera: Conjunctivae normal.   Neck:      Vascular: No carotid bruit. Cardiovascular:      Rate and Rhythm: Normal rate and regular rhythm. Pulmonary:      Effort: Pulmonary effort is normal.      Breath sounds: Normal breath sounds. Musculoskeletal:      Right lower leg: No edema. Left lower leg: No edema. Skin:     General: Skin is warm and dry. Findings: No rash. Neurological:      Mental Status: She is alert and oriented to person, place, and time. Cranial Nerves: No dysarthria or facial asymmetry. Motor: Motor function is intact. Gait: Gait is intact.  Gait normal.   Psychiatric: Attention and Perception: Attention normal.         Mood and Affect: Mood normal.         Behavior: Behavior normal. Behavior is cooperative. ASSESSMENT and PLAN    1. Witnessed apneic spells  -     [address]  2. Pure hypercholesterolemia  3. Gastroesophageal reflux disease, unspecified whether esophagitis present  4. Elevated coronary artery calcium score  5. Muscle pain  -     Sedimentation Rate; Future  -     Myoglobin, Serum; Future  6. Elevated BP without diagnosis of hypertension  7. Hiatal hernia  Assessment & Plan:   on Zegerid daily. plan EGD after cardiac work up. Return for Follow-Up, regularly scheduled appointment or sooner prn.  the following changes in treatment are made: see above  lab results and schedule of future lab studies reviewed with patient  reviewed medications and side effects in detail      CHRISTOPHER Henson - NP    Dictated using voice recognition software.  Proofread, but unrecognized voice recognition errors may exist.

## 2022-08-22 ENCOUNTER — TELEPHONE (OUTPATIENT)
Dept: INTERNAL MEDICINE CLINIC | Facility: CLINIC | Age: 57
End: 2022-08-22

## 2022-08-22 NOTE — TELEPHONE ENCOUNTER
----- Message from CHRISTOPHER Carrillo NP sent at 8/20/2022  8:46 PM EDT -----  The muscle enzyme and inflammatory markers are normal.

## 2022-08-24 ENCOUNTER — OFFICE VISIT (OUTPATIENT)
Dept: CARDIOLOGY CLINIC | Age: 57
End: 2022-08-24
Payer: COMMERCIAL

## 2022-08-24 VITALS
SYSTOLIC BLOOD PRESSURE: 136 MMHG | WEIGHT: 173.4 LBS | BODY MASS INDEX: 28.89 KG/M2 | HEIGHT: 65 IN | HEART RATE: 68 BPM | DIASTOLIC BLOOD PRESSURE: 80 MMHG

## 2022-08-24 DIAGNOSIS — R07.89 NON-CARDIAC CHEST PAIN: Primary | ICD-10-CM

## 2022-08-24 PROCEDURE — 93000 ELECTROCARDIOGRAM COMPLETE: CPT | Performed by: INTERNAL MEDICINE

## 2022-08-24 PROCEDURE — 99203 OFFICE O/P NEW LOW 30 MIN: CPT | Performed by: INTERNAL MEDICINE

## 2022-08-24 RX ORDER — ATORVASTATIN CALCIUM 10 MG/1
10 TABLET, FILM COATED ORAL DAILY
COMMUNITY

## 2022-08-24 ASSESSMENT — ENCOUNTER SYMPTOMS
NAUSEA: 1
DIARRHEA: 1

## 2022-08-24 NOTE — PROGRESS NOTES
[facility] CARDIOLOGY  [address] [facility], [address]  [place], [address] [facility]  PHONE: [phone]      22    NAME:  Sherryle Phi  : 1965  MRN: [id number]         SUBJECTIVE:   Sherryle Phi is a 61 y.o. female seen for a consultation visit regarding the following:     Chief Complaint   Patient presents with    Chest Pain     Off  & on- None today            HPI:  Consultation is requested by CHRISTOPHER Zamorano - NP for evaluation of Chest Pain (Off  & on- None today)   . Patient is new to me, has previously seen Dr Jose G Montoya in the past with palpitations, no significant abnormality on monitor, presents for chest discomfort. She had a feeling of someone squeezing her all the way around the epigastrium and back, happened about a week ago, around 2 am, thought it was gerd, never resolved, went on to work, still hurting at end of day, went to urgent care from where she was sent to ER with negative evaluation, she still had the discomfort when she left the ER, all told lasted at least 3 days. She had eaten a chocolate doughnut before she went to bed the night it all started. Since then she's seen her PCP who reminded her she has hiatal hernia, is scheduled to see GI. She's on PPI, has continued. ER told her to stop atorvastatin for 2 weeks to see if symptoms would improve. She does pilates regularly, appropriate soreness, no exertional chest discomfort. PAST CARDIAC HISTORY:          30 day monitor - 7 beat run AT, otherwise normal monitor          Past Medical History, Past Surgical History, Family history, Social History, and Medications were all reviewed with the patient today and updated as necessary. Prior to Admission medications    Medication Sig Start Date End Date Taking?  Authorizing Provider   atorvastatin (LIPITOR) 10 MG tablet Take 10 mg by mouth daily ON HOLD   Yes Historical Provider, MD Oscar Peacock) 0.5 MG tablet Take 2 tablets by mouth once as Bore, no medication at this time (19)    Ovarian cyst     Personal history of colonic polyps     TVA, TA    Rectocele     S/P cataract extraction and insertion of intraocular lens 3/3/2016     Past Surgical History:   Procedure Laterality Date    CATARACT REMOVAL Bilateral     with iol     SECTION      COLONOSCOPY  2014,     2014 -[facility]--1.5 cm sigmoid TVA, 5 small asc TAs--3 year recall;  -- mixed TVA    DILATION AND CURETTAGE OF UTERUS  2019    PELVIC LAPAROSCOPY      laproscopic for endometriosis x14     Family History   Problem Relation Age of Onset    Hypertension Mother     Hypertension Father     Diabetes Father     Heart Disease Father     Elevated Lipids Father     Coronary Art Dis Father         stents x2    Diabetes Brother         uncontrolled    Elevated Lipids Brother     Hypertension Brother     Stroke Brother         also had a brain bleed but unknown cause    Breast Cancer Paternal Aunt     Heart Disease Maternal Grandfather     Stroke Paternal Grandmother     Heart Disease Paternal Grandmother     Breast Cancer Maternal Grandmother 79     Social History     Tobacco Use    Smoking status: Former     Packs/day: 0.50     Years: 17.00     Pack years: 8.50     Types: Cigarettes     Quit date: 2001     Years since quittin.6    Smokeless tobacco: Never   Substance Use Topics    Alcohol use: No       ROS:    Review of Systems   Gastrointestinal:  Positive for diarrhea (once or twice) and nausea. PHYSICAL EXAM:   /80   Pulse 68   Ht 5' 5\" (1.651 m)   Wt 173 lb 6.4 oz (78.7 kg)   BMI 28.86 kg/m²      Physical Exam  Constitutional:       Appearance: Normal appearance. HENT:      Head: Normocephalic and atraumatic. Eyes:      Conjunctiva/sclera: Conjunctivae normal.   Neck:      Vascular: No carotid bruit. Cardiovascular:      Rate and Rhythm: Normal rate and regular rhythm. Heart sounds: No murmur heard. No friction rub.  No gallop. Pulmonary:      Effort: No respiratory distress. Breath sounds: No wheezing or rales. Abdominal:      General: Abdomen is flat. There is no distension. Palpations: Abdomen is soft. Tenderness: There is no abdominal tenderness. Musculoskeletal:         General: No swelling. Cervical back: Neck supple. Skin:     General: Skin is warm and dry. Neurological:      General: No focal deficit present. Mental Status: She is alert. Psychiatric:         Mood and Affect: Mood normal.         Behavior: Behavior normal.       Medical problems and test results were reviewed with the patient today.      DATA REVIEW    BMP  Lab Results   Component Value Date/Time     08/19/2022 10:35 AM    K 4.5 08/19/2022 10:35 AM     08/19/2022 10:35 AM    CO2 24 08/19/2022 10:35 AM    BUN 12 08/19/2022 10:35 AM    CREATININE 0.90 08/19/2022 10:35 AM    GLUCOSE 85 08/19/2022 10:35 AM    CALCIUM 9.7 08/19/2022 10:35 AM        LIPIDS  Lab Results   Component Value Date    CHOL 177 08/19/2022    CHOL 216 (H) 05/24/2022    CHOL 238 (H) 02/16/2022     Lab Results   Component Value Date    TRIG 97 08/19/2022    TRIG 128 05/24/2022    TRIG 137 02/16/2022     Lab Results   Component Value Date    HDL 64 (H) 08/19/2022    HDL 63 (H) 05/24/2022    HDL 61 02/16/2022     Lab Results   Component Value Date    LDLCALC 93.6 08/19/2022    LDLCALC 127.4 (H) 05/24/2022    LDLCALC 153 (H) 02/16/2022     Lab Results   Component Value Date    LABVLDL 19.4 08/19/2022    LABVLDL 25.6 (H) 05/24/2022    LABVLDL 26 10/03/2019    VLDL 24 02/16/2022    VLDL 19 08/09/2021    VLDL 26 12/09/2020     Lab Results   Component Value Date    CHOLHDLRATIO 2.8 08/19/2022    CHOLHDLRATIO 3.4 05/24/2022       EKG    Sinus  Rhythm 68  normal axis  first degree AV block  normal ST/T    CXR/IMAGING        DEVICE INTERROGATION        OUTSIDE RECORDS REVIEW    Records from outside providers have been reviewed and summarized as noted in the HPI, past history and data review sections of this note, and reviewed with patient. .       ASSESSMENT and PLAN    Maida Mccallum was seen today for chest pain. Diagnoses and all orders for this visit:    Non-cardiac chest pain  -     EKG 12 Lead        IMPRESSION:    Clearly GI discomfort, whether from [facility] or gallbladder attack after eating chocolate doughnut with some intermittent watery diarrhea. Resume risk reduction activities, healthy diet (she's considering a plant based diet, particularly with her MS), and resume exercise    Whether to resume atorvastatin will leave up to her and PCP, possible it exacerbated GERD but more likely the doughnut she ate that night. Return if symptoms worsen or fail to improve. Thank you for allowing me to participate in this patient's care. Please call or contact me if there are any questions or concerns regarding the above.       Jasmin Marlow MD  08/24/22  12:49 PM

## 2022-08-30 ENCOUNTER — TELEMEDICINE (OUTPATIENT)
Dept: INTERNAL MEDICINE CLINIC | Facility: CLINIC | Age: 57
End: 2022-08-30
Payer: COMMERCIAL

## 2022-08-30 DIAGNOSIS — R05.8 NON-PRODUCTIVE COUGH: ICD-10-CM

## 2022-08-30 DIAGNOSIS — U07.1 COVID-19: Primary | ICD-10-CM

## 2022-08-30 PROCEDURE — 99213 OFFICE O/P EST LOW 20 MIN: CPT | Performed by: NURSE PRACTITIONER

## 2022-08-30 RX ORDER — BENZONATATE 200 MG/1
200 CAPSULE ORAL 3 TIMES DAILY PRN
Qty: 30 CAPSULE | Refills: 0 | Status: SHIPPED | OUTPATIENT
Start: 2022-08-30 | End: 2022-09-06

## 2022-08-30 ASSESSMENT — ENCOUNTER SYMPTOMS
SORE THROAT: 1
SHORTNESS OF BREATH: 0
COUGH: 1
WHEEZING: 0

## 2022-08-30 ASSESSMENT — PATIENT HEALTH QUESTIONNAIRE - PHQ9
SUM OF ALL RESPONSES TO PHQ QUESTIONS 1-9: 0
2. FEELING DOWN, DEPRESSED OR HOPELESS: 0

## 2022-08-30 NOTE — PROGRESS NOTES
[x] Alert and awake  [x] Oriented to person/place/time [x] Able to follow commands    [] Abnormal -     Eyes:   EOM    [x]  Normal    [] Abnormal -   Sclera  [x]  Normal    [] Abnormal -          Discharge [x]  None visible   [] Abnormal -     HENT: [x] Normocephalic, atraumatic  [x] Abnormal - audible nasal congestion  [x] Mouth/Throat: Mucous membranes are moist    External Ears [x] Normal  [] Abnormal -    Neck: [x] No visualized mass [] Abnormal -     Pulmonary/Chest: [x] Respiratory effort normal   [x] No visualized signs of difficulty breathing or respiratory distress        [] Abnormal -      Musculoskeletal:   [x] Normal gait with no signs of ataxia         [x] Normal range of motion of neck        [] Abnormal -     Neurological:        [x] No Facial Asymmetry (Cranial nerve 7 motor function) (limited exam due to video visit)          [x] No gaze palsy        [] Abnormal -          Skin:        [x] No significant exanthematous lesions or discoloration noted on facial skin         [] Abnormal -            Psychiatric:       [x] Normal Affect [] Abnormal -        [x] No Hallucinations    Other pertinent observable physical exam findings:-         On this date 8/30/2022 I have  spent 26 minutes reviewing previous notes, test results and face to face (virtual) with the patient discussing the diagnosis and importance of compliance with the treatment plan as well as documenting on the day of the visit. [facility], was evaluated through a synchronous (real-time) audio-video encounter. The patient (or guardian if applicable) is aware that this is a billable service, which includes applicable co-pays. This Virtual Visit was conducted with patient's (and/or legal guardian's) consent. The visit was conducted pursuant to the emergency declaration under the [facility], [address] [place] [address] [facility] authority and the [employer] [place] [employer] Act.   Patient identification was verified, and a caregiver was present when appropriate. The patient was located at Home:  [place] [address] [address] Provider was located at [address]): [address] Mehreen Torres [address] [facility].         --CHRISTOPHER Roman NP

## 2022-09-22 ENCOUNTER — OFFICE VISIT (OUTPATIENT)
Dept: GYNECOLOGY | Age: 57
End: 2022-09-22
Payer: COMMERCIAL

## 2022-09-22 VITALS
HEIGHT: 65 IN | DIASTOLIC BLOOD PRESSURE: 80 MMHG | BODY MASS INDEX: 28.82 KG/M2 | WEIGHT: 173 LBS | SYSTOLIC BLOOD PRESSURE: 110 MMHG

## 2022-09-22 DIAGNOSIS — R93.89 THICKENED ENDOMETRIUM: Primary | ICD-10-CM

## 2022-09-22 PROCEDURE — 99214 OFFICE O/P EST MOD 30 MIN: CPT | Performed by: OBSTETRICS & GYNECOLOGY

## 2022-09-22 PROCEDURE — 76830 TRANSVAGINAL US NON-OB: CPT | Performed by: OBSTETRICS & GYNECOLOGY

## 2022-09-22 NOTE — PROGRESS NOTES
REBECCA Moreno is a 61 y.o. female seen for     Past Medical History, Past Surgical History, Family history, Social History, and Medications were all reviewed with the patient today and updated as necessary. Current Outpatient Medications   Medication Sig    ALPRAZolam (XANAX) 0.5 MG tablet Take 2 tablets by mouth once as needed for Anxiety (MRI) for up to 1 dose. omeprazole-sodium bicarbonate (ZEGERID)  MG per capsule Take 1 capsule by mouth    cyanocobalamin 1000 MCG/ML injection Inject 1,000 mcg into the muscle    EPINEPHrine (EPIPEN) 0.3 MG/0.3ML SOAJ injection USE AS DIRECTED AS NEEDED    ergocalciferol (ERGOCALCIFEROL) 1.25 MG (70771 UT) capsule Take 50,000 Units by mouth Twice a Week    loratadine (CLARITIN) 10 MG tablet TAKE 1 TABLET BY MOUTH EVERY DAY    atorvastatin (LIPITOR) 10 MG tablet Take 10 mg by mouth daily ON HOLD (Patient not taking: No sig reported)    gabapentin (NEURONTIN) 100 MG capsule Take 1 capsule by mouth 4 times daily for 180 days. Intended supply: 30 days (Patient not taking: No sig reported)     No current facility-administered medications for this visit. Allergies   Allergen Reactions    Latex Itching    Aspirin Swelling and Angioedema    Casein Hives    Dimethyl Fumarate Other (See Comments)     Hip pain severe dissipated on d/c Tecfidera.     Palpitations     Interferons Other (See Comments)     Copaxone caused SEIZURES    Metronidazole Shortness Of Breath    Nitroglycerin Itching and Rash     Blisters & skin peeling    Shellfish-Derived Products Hives     Pt has never had a reaction to IV contrast Dye  Pt has never had a reaction to IV contrast Dye       Past Medical History:   Diagnosis Date    Anxiety     Autoimmune disease ([facility] [address]     MS    Endometriosis     Environmental and seasonal allergies     Family history of colonic polyps     mother    Fever blister     GERD (gastroesophageal reflux disease)     controlled with medication     H/O echocardiogram 2017    EF 55-60%    History of palpitations     r/t medication     Internal hemorrhoids without mention of complication 6603    Multiple sclerosis ([facility] [address]     Followed by Dr. Clau Newton, no medication at this time (19)    Ovarian cyst     Personal history of colonic polyps     TVA, TA    Rectocele     S/P cataract extraction and insertion of intraocular lens 3/3/2016     Past Surgical History:   Procedure Laterality Date    CATARACT REMOVAL Bilateral     with iol     SECTION      COLONOSCOPY  2014, 2017 -[facility]--1.5 cm sigmoid TVA, 5 small asc TAs--3 year recall;  -- mixed TVA    DILATION AND CURETTAGE OF UTERUS  2019    ENDOMETRIAL BIOPSY       benign    PELVIC LAPAROSCOPY      laproscopic for endometriosis x14     Family History   Problem Relation Age of Onset    Hypertension Mother     Hypertension Father     Diabetes Father     Heart Disease Father     Elevated Lipids Father     Coronary Art Dis Father         stents x2    Diabetes Brother         uncontrolled    Elevated Lipids Brother     Hypertension Brother     Stroke Brother         also had a brain bleed but unknown cause    Breast Cancer Paternal Aunt     Heart Disease Maternal Grandfather     Stroke Paternal Grandmother     Heart Disease Paternal Grandmother     Breast Cancer Maternal Grandmother 79      Social History     Tobacco Use    Smoking status: Former     Packs/day: 0.50     Years: 17.00     Pack years: 8.50     Types: Cigarettes     Quit date: 2001     Years since quittin.7    Smokeless tobacco: Never   Substance Use Topics    Alcohol use: No       Social History     Substance and Sexual Activity   Sexual Activity Not Currently     OB History    Para Term  AB Living   1 1 0 0 0 0   SAB IAB Ectopic Molar Multiple Live Births   0 0 0 0 0 0      # Outcome Date GA Lbr Zan/2nd Weight Sex Delivery Anes PTL Lv   1 Para               Obstetric Comments    ROS:    Review of Systems  General: Not Present- Chills, Fever, Fatigue, Insomnia, Hot flashes/Night sweats, Weight gain  Skin: Not Present- Bruising, Change in Wart/Mole, Excessive Sweating, Itching, Nail Changes, New Lesions, Rash, Skin Color Changes and Ulcer. HEENT: Not Present- Headache, Blurred Vision, Double Vision, Glaucoma, Visual Disturbances, Hearing Loss, Ringing in the Ears, Vertigo, Nose Bleed, Bleeding Gums, Hoarseness and Sore Throat. Neck: Not Present- Neck Pain and Neck Swelling. Respiratory: Not Present- Cough, Difficulty Breathing and Difficulty Breathing on Exertion. Breast: Not Present- Breast Mass, Breast Pain, Breast Swelling, Nipple Discharge, Nipple Pain, Recent Breast Size Changes and Skin Changes. Cardiovascular: Not Present- Abnormal Blood Pressure, Chest Pain, Edema, Fainting / Blacking Out, Palpitations, Shortness of Breath and Swelling of Extremities. Gastrointestinal: Not Present- Abdominal Pain, Abdominal Swelling, Bloating, Change in Bowel Habits, Constipation, Diarrhea, Difficulty Swallowing, Gets full quickly at meals, Nausea, Rectal Bleeding and Vomiting. Female Genitourinary: Not Present- Dysmenorrhea, Dyspareunia, Decreased libido, Excessive Menstrual Bleeding, Menstrual Irregularities, Pelvic Pain, Urinary Complaints, Vaginal Discharge, Vaginal itching/burning, Vaginal odor  Musculoskeletal: Not Present- Joint Pain and Muscle Pain. Neurological: Not Present- Dizziness, Fainting, Headaches and Seizures. Psychiatric: Not Present- Anxiety, Depression, Mood changes and Panic Attacks. Endocrine: Not Present- Appetite Changes, Cold Intolerance, Excessive Thirst, Excessive Urination and Heat Intolerance. Hematology: Not Present- Abnormal Bleeding, Easy Bruising and Enlarged Lymph Nodes. PHYSICAL EXAM:    /80   Ht 5' 5\" (1.651 m)   Wt 173 lb (78.5 kg)   BMI 28.79 kg/m²           Medical problems and test results were reviewed with the patient today. ASSESSMENT and PLAN    {There are no diagnoses linked to this encounter. (Refresh or delete this SmartLink)}           Time:  I spent  *** minutes in preparing to see patient (including chart review and preparation), obtaining and/or reviewing additional medical history, performing a physical exam and evaluation, documenting clinical information in the electronic health record, independently interpreting results, communicating results to patient, family or caregiver, and/or coordinating care. No follow-ups on file.        Naa Penaloza LPN

## 2022-10-03 NOTE — PROGRESS NOTES
[address] [place] [address] Court, [address] [facility]  [phone]    Patient Name:  Blayne Bynum  YOB: 1965      Office Visit 10/4/2022    CHIEF COMPLAINT:    Chief Complaint   Patient presents with    New Patient       HISTORY OF PRESENT ILLNESS:      The patient is a 59-year-old female who presents in outpatient consultation at the request of Seda Gillis NP for evaluation of suspected sleep apnea. Significant PMH of anxiety, MS, GERD, and palpitations. She reports that her  and daughter have both been telling her that she has been snoring and she will occasionally stop breathing. States that her neurologist recently told her that she had a large tongue and was concerned she may have sleep apnea. She has also been waking up during the night and has a feeling like her heart is racing. States she is tired during the day and does not feel eager to get out of bed in the mornings. Excello score is 2/24. She denies any morning headaches. Her typical sleep hours are from 11 PM until 6:30 AM and she wakes up to an alarm. She reports waking up 1 time during the night to go to the bathroom but then she will also wake up approximately 3 times with her mind wandering. Denies any problems going to sleep and states that it only takes her about 15 to 20 minutes to fall asleep. She has noticed that she moves her legs around more during the day than she used to but denies any pain in her legs. Does report that her  has told her that she acts like she is trying to climb a mountain while sleeping. Reports a history of grinding her teeth. She denies having any caffeine after lunch time during the day. Denies any alcohol use, tobacco use, or illicit drug use. States that her weight has consistently been around 175-180 pounds over the last year. She denies any swelling in her legs or ankles. Her blood pressure is controlled today.      Sleep Medicine 10/4/2022   Sitting and reading 1   Watching TV 1   Sitting, inactive in a public place (e.g. a theatre or a meeting) 0   As a passenger in a car for an hour without a break 0   Lying down to rest in the afternoon when circumstances permit 0   Sitting and talking to someone 0   Sitting quietly after a lunch without alcohol 0   In a car, while stopped for a few minutes in traffic 0   [place] Sleepiness Score 2            Past Medical History:   Diagnosis Date    Anxiety     Autoimmune disease ([facility] [address]     MS    Endometriosis     Environmental and seasonal allergies     Family history of colonic polyps     mother    Fever blister     GERD (gastroesophageal reflux disease)     controlled with medication     H/O echocardiogram 2017    EF 55-60%    History of palpitations     r/t medication     Internal hemorrhoids without mention of complication 3237    Multiple sclerosis ([facility] [address]     Followed by Dr. Danielle Huff, no medication at this time (19)    Ovarian cyst     Personal history of colonic polyps     TVA, TA    Rectocele     S/P cataract extraction and insertion of intraocular lens 3/3/2016         Patient Active Problem List   Diagnosis    Elevated ALT measurement    Encounter for medication management    GERD (gastroesophageal reflux disease)    S/P cataract extraction and insertion of intraocular lens    Relapsing remitting multiple sclerosis ([facility] [address]    Hx of idiopathic seizure    Pure hypercholesterolemia    Elevated coronary artery calcium score    Vitamin D deficiency    Pelvic pain in female    Personal history of colonic polyps    Hormone replacement therapy (postmenopausal)    Aspirin allergy    Internal hemorrhoids    Paresthesia    Tremor    Hiatal hernia           Past Surgical History:   Procedure Laterality Date    CATARACT REMOVAL Bilateral     with iol     SECTION      COLONOSCOPY  2014, 2017 -[facility]--1.5 cm sigmoid TVA, 5 small asc TAs--3 year recall;  -- mixed TVA Interferons Other (See Comments)     Copaxone caused SEIZURES    Metronidazole Shortness Of Breath    Nitroglycerin Itching and Rash     Blisters & skin peeling    Shellfish-Derived Products Hives     Pt has never had a reaction to IV contrast Dye  Pt has never had a reaction to IV contrast Dye           Current Outpatient Medications   Medication Sig    omeprazole-sodium bicarbonate (ZEGERID)  MG per capsule Take 1 capsule by mouth    EPINEPHrine (EPIPEN) 0.3 MG/0.3ML SOAJ injection USE AS DIRECTED AS NEEDED    ergocalciferol (ERGOCALCIFEROL) 1.25 MG (85918 UT) capsule Take 50,000 Units by mouth Twice a Week    loratadine (CLARITIN) 10 MG tablet TAKE 1 TABLET BY MOUTH EVERY DAY    meloxicam (MOBIC) 15 MG tablet     atorvastatin (LIPITOR) 10 MG tablet Take 10 mg by mouth daily ON HOLD (Patient not taking: No sig reported)    gabapentin (NEURONTIN) 100 MG capsule Take 1 capsule by mouth 4 times daily for 180 days. Intended supply: 30 days (Patient not taking: No sig reported)    ALPRAZolam (XANAX) 0.5 MG tablet Take 2 tablets by mouth once as needed for Anxiety (MRI) for up to 1 dose. cyanocobalamin 1000 MCG/ML injection Inject 1,000 mcg into the muscle     No current facility-administered medications for this visit. REVIEW OF SYSTEMS:   CONSTITUTIONAL:   There is no history of fever, chills, night sweats, weight loss, weight gain, persistent fatigue, or lethargy/hypersomnolence. EYES:   Denies problems with eye pain, erythema, blurred vision, or visual field loss. ENTM:   Denies history of tinnitus, epistaxis, sore throat, hoarseness, or dysphonia. LYMPH:   Denies swollen glands. CARDIAC:   No chest pain, pressure, discomfort, palpitations, orthopnea, murmurs, or edema. GI:   No dysphagia, heartburn reflux, nausea/vomiting, diarrhea, abdominal pain, or bleeding. :   Denies history of dysuria, hematuria, polyuria, or decreased urine output.    MS:   No history of myalgias, arthralgias, bone pain, or muscle cramps. SKIN:   No history of rashes, jaundice, cyanosis, nodules, or ulcers. ENDO:   Negative for heat or cold intolerance. No history of DM. PSYCH:   Negative for anxiety, depression, insomnia, hallucinations. NEURO:   There is no history of AMS, persistent headache, decreased level of consciousness, seizures, or motor or sensory deficits. PHYSICAL EXAM:    Vitals:    10/04/22 1125   BP: 120/66   Pulse: 77   Resp: 14   Temp: 96.9 °F (36.1 °C)   SpO2: 98%   Weight: 176 lb 1.6 oz (79.9 kg)   Height: 5' 6\" (1.676 m)        GENERAL APPEARANCE:   The patient is normal weight and in no respiratory distress. HEENT:   PERRL. Conjunctivae unremarkable. Nasal mucosa is without epistaxis, exudate, or polyps. Nares patent bilateral.  Gums and dentition are unremarkable. There is oropharyngeal narrowing. Ross score 2. NECK/LYMPHATIC:   Symmetrical with no elevation of jugular venous pulsation. Trachea midline. No thyroid enlargement. No cervical adenopathy. LUNGS:   Normal respiratory effort with symmetrical lung expansion. Breath sounds clear. HEART:   There is a regular rate and rhythm. No murmur, rub, or gallop. There is no edema in the lower extremities. ABDOMEN:   Soft and non-tender. No hepatosplenomegaly. Bowel sounds are normal.     SKIN:   There are no rashes, cyanosis, jaundice, or ecchymosis present. EXTREMITIES:   The extremities are unremarkable without clubbing, cyanosis, joint inflammation, degenerative, or ischemic change. MUSCULOSKELETAL:   There is no abnormal tone, muscle atrophy, or abnormal movement present. NEURO:   The patient is alert and oriented to person, place, and time. Memory appears intact and mood is normal.  No gross sensorimotor deficits are present. ASSESSMENT:  (Medical Decision Making)         ICD-10-CM    1.  Suspected sleep apnea  R29.818 Ambulatory Referral to Sleep Studies - The pathophysiology of obstructive sleep apnea was reviewed with the patient. It's potential to promote severe neurologic, cardiac, pulmonary, and gastrointestinal problems was discussed. Specifically, the increased incidence of hypertension, coronary artery disease, congestive heart failure, pulmonary hypertension, gastroesophageal reflux, pathologic hypersomnolence, memory loss, and glucose intolerance was related to the consequences of hypoxemia, hypercapnia, airway obstruction, and sympathetic overdrive. We also discussed the ability of nasal CPAP to correct these abnormalities through maintenance of a patent airway. Therapeutic options including surgery, oral appliances, and weight loss were also reviewed. Patient is in agreement with starting  CPap therapy as treatment plan      2. Hypersomnia  G47.10 Will reevaluate hypersomnia after sleep study and potentially starting CPAP. 3. Snoring  R06.83 Patient will work on positional therapy while sleeping in the side-lying position and avoid sleeping on her back      4. Sleep-related movement disorder  G47.69 Will reevaluate after starting CPAP therapy if positive for sleep apnea      5. Persistent disorder of initiating or maintaining sleep  G47.00 Start melatonin 5 mg extended release at bedtime              PLAN:  Home sleep study ordered  Recommendations as above  Appropriate handouts were given to patient including information on sleep apnea, sleep hygiene and PAP therapy.   Follow up with the Sleep Center will be after sleep study or 3 months after starting CPAP therapy or sooner if needed          Orders Placed This Encounter   Procedures    Ambulatory Referral to Sleep Studies     Referral Priority:   Routine     Referral Type:   Consult for Advice and Opinion     Referral Reason:   Specialty Services Required     Number of Visits Requested:   1              Collaborating Physician: Dr. Jolly Drew     Over 50% of today's office visit was spent in face to face time reviewing test results, prognosis, importance of compliance, education about disease process, benefits of medications, instructions for management of acute flare-ups, and follow up plans. Total face to face time spent with patient was 40 minutes.     [address], APRN - CNP  Electronically signed

## 2022-10-04 ENCOUNTER — OFFICE VISIT (OUTPATIENT)
Dept: SLEEP MEDICINE | Age: 57
End: 2022-10-04
Payer: COMMERCIAL

## 2022-10-04 VITALS
RESPIRATION RATE: 14 BRPM | HEIGHT: 66 IN | HEART RATE: 77 BPM | OXYGEN SATURATION: 98 % | TEMPERATURE: 96.9 F | BODY MASS INDEX: 28.3 KG/M2 | DIASTOLIC BLOOD PRESSURE: 66 MMHG | SYSTOLIC BLOOD PRESSURE: 120 MMHG | WEIGHT: 176.1 LBS

## 2022-10-04 DIAGNOSIS — G47.00 PERSISTENT DISORDER OF INITIATING OR MAINTAINING SLEEP: ICD-10-CM

## 2022-10-04 DIAGNOSIS — R29.818 SUSPECTED SLEEP APNEA: Primary | ICD-10-CM

## 2022-10-04 DIAGNOSIS — R06.83 SNORING: ICD-10-CM

## 2022-10-04 DIAGNOSIS — G47.69 SLEEP-RELATED MOVEMENT DISORDER: ICD-10-CM

## 2022-10-04 DIAGNOSIS — G47.10 HYPERSOMNIA: ICD-10-CM

## 2022-10-04 PROCEDURE — 99203 OFFICE O/P NEW LOW 30 MIN: CPT | Performed by: NURSE PRACTITIONER

## 2022-10-04 RX ORDER — MELOXICAM 15 MG/1
TABLET ORAL
COMMUNITY
Start: 2022-09-29

## 2022-10-04 ASSESSMENT — SLEEP AND FATIGUE QUESTIONNAIRES
HOW LIKELY ARE YOU TO NOD OFF OR FALL ASLEEP WHEN YOU ARE A PASSENGER IN A CAR FOR AN HOUR WITHOUT A BREAK: 0
HOW LIKELY ARE YOU TO NOD OFF OR FALL ASLEEP WHILE SITTING QUIETLY AFTER LUNCH WITHOUT ALCOHOL: 0
ESS TOTAL SCORE: 2
HOW LIKELY ARE YOU TO NOD OFF OR FALL ASLEEP WHILE LYING DOWN TO REST IN THE AFTERNOON WHEN CIRCUMSTANCES PERMIT: 0
HOW LIKELY ARE YOU TO NOD OFF OR FALL ASLEEP IN A CAR, WHILE STOPPED FOR A FEW MINUTES IN TRAFFIC: 0
HOW LIKELY ARE YOU TO NOD OFF OR FALL ASLEEP WHILE SITTING AND READING: 1
HOW LIKELY ARE YOU TO NOD OFF OR FALL ASLEEP WHILE WATCHING TV: 1
HOW LIKELY ARE YOU TO NOD OFF OR FALL ASLEEP WHILE SITTING AND TALKING TO SOMEONE: 0
HOW LIKELY ARE YOU TO NOD OFF OR FALL ASLEEP WHILE SITTING INACTIVE IN A PUBLIC PLACE: 0

## 2022-10-04 NOTE — PATIENT INSTRUCTIONS
Home sleep study ordered  Recommendations as above  Appropriate handouts were given to patient including information on sleep apnea, sleep hygiene and PAP therapy.   Follow up with the Sleep Center will be after sleep study or 3 months after starting CPAP therapy or sooner if needed

## 2022-10-27 ENCOUNTER — HOSPITAL ENCOUNTER (OUTPATIENT)
Dept: GENERAL RADIOLOGY | Age: 57
Discharge: HOME | End: 2022-10-30
Payer: COMMERCIAL

## 2022-10-27 DIAGNOSIS — R10.9 ACUTE ABDOMINAL PAIN: ICD-10-CM

## 2022-10-27 DIAGNOSIS — R10.9 ABDOMINAL PAIN, UNSPECIFIED ABDOMINAL LOCATION: ICD-10-CM

## 2022-10-27 DIAGNOSIS — K44.9 DIAPHRAGMATIC HERNIA WITHOUT OBSTRUCTION AND WITHOUT GANGRENE: ICD-10-CM

## 2022-10-27 DIAGNOSIS — K21.9 GASTROESOPHAGEAL REFLUX DISEASE WITHOUT ESOPHAGITIS: ICD-10-CM

## 2022-10-27 PROCEDURE — 6360000004 HC RX CONTRAST MEDICATION: Performed by: PHYSICIAN ASSISTANT

## 2022-10-27 PROCEDURE — 74246 X-RAY XM UPR GI TRC 2CNTRST: CPT

## 2022-10-27 PROCEDURE — 6370000000 HC RX 637 (ALT 250 FOR IP): Performed by: PHYSICIAN ASSISTANT

## 2022-10-27 PROCEDURE — 2500000003 HC RX 250 WO HCPCS: Performed by: PHYSICIAN ASSISTANT

## 2022-10-27 PROCEDURE — A4641 RADIOPHARM DX AGENT NOC: HCPCS | Performed by: PHYSICIAN ASSISTANT

## 2022-10-27 RX ADMIN — BARIUM SULFATE 1 TABLET: 700 TABLET ORAL at 08:50

## 2022-10-27 RX ADMIN — BARIUM SULFATE 140 ML: 980 POWDER, FOR SUSPENSION ORAL at 08:49

## 2022-10-27 RX ADMIN — BARIUM SULFATE 355 ML: 0.6 SUSPENSION ORAL at 08:50

## 2022-10-27 RX ADMIN — ANTACID/ANTIFLATULENT 1 EACH: 380; 550; 10; 10 GRANULE, EFFERVESCENT ORAL at 08:51

## 2022-11-03 RX ORDER — FLUCONAZOLE 150 MG/1
TABLET ORAL
Qty: 2 TABLET | Refills: 1 | Status: SHIPPED | OUTPATIENT
Start: 2022-11-03

## 2022-11-07 ENCOUNTER — TELEPHONE (OUTPATIENT)
Dept: NEUROLOGY | Age: 57
End: 2022-11-07

## 2022-11-07 NOTE — TELEPHONE ENCOUNTER
Patient would like the MRI brain orders to be sent to [facility] at fax [phone].

## 2022-11-17 ENCOUNTER — TELEPHONE (OUTPATIENT)
Dept: NEUROLOGY | Age: 57
End: 2022-11-17

## 2022-11-17 NOTE — TELEPHONE ENCOUNTER
Patient was given an RX for Xanax back in July to take before her MRI. She has her MRI appt on 12/5/22 and states she needs another prescription for Xanax. She stated that she \"ate them\" when she was having an anxiety attack or was on an elevator. Please advise.

## 2022-11-18 ENCOUNTER — TELEPHONE (OUTPATIENT)
Dept: NEUROLOGY | Age: 57
End: 2022-11-18

## 2022-11-18 DIAGNOSIS — F40.240 CLAUSTROPHOBIA: Primary | ICD-10-CM

## 2022-11-18 RX ORDER — LORAZEPAM 0.5 MG/1
0.5 TABLET ORAL
Qty: 2 TABLET | Refills: 0 | Status: SHIPPED | OUTPATIENT
Start: 2022-11-18 | End: 2022-11-18

## 2022-11-18 NOTE — TELEPHONE ENCOUNTER
I called the pt and spoke with her, she is going to have the MRI done at [address] due to cost that she has to pay. [facility] is 170.00 or 500.00 for the hospital. She said that she took those others that was given back in July.

## 2022-11-23 DIAGNOSIS — E55.9 VITAMIN D DEFICIENCY, UNSPECIFIED: ICD-10-CM

## 2022-11-29 ENCOUNTER — HOSPITAL ENCOUNTER (OUTPATIENT)
Dept: SLEEP CENTER | Age: 57
Discharge: HOME | End: 2022-12-02

## 2022-11-30 DIAGNOSIS — G35 RELAPSING REMITTING MULTIPLE SCLEROSIS (HCC): ICD-10-CM

## 2022-12-01 RX ORDER — ERGOCALCIFEROL 1.25 MG/1
CAPSULE ORAL
Qty: 24 CAPSULE | Refills: 3 | OUTPATIENT
Start: 2022-12-01

## 2022-12-01 NOTE — TELEPHONE ENCOUNTER
MD Kishan Garcia MA  Caller: Unspecified (1 week ago)  Defer such always to PCP .    bmt     I called and spoke with the pt, she will contact PCP for a refill on her Vitamin D.

## 2022-12-02 DIAGNOSIS — E55.9 VITAMIN D DEFICIENCY, UNSPECIFIED: Primary | ICD-10-CM

## 2022-12-02 DIAGNOSIS — E53.8 VITAMIN B12 DEFICIENCY: ICD-10-CM

## 2022-12-02 RX ORDER — CYANOCOBALAMIN 1000 UG/ML
1000 INJECTION, SOLUTION INTRAMUSCULAR; SUBCUTANEOUS
Qty: 3 ML | Refills: 3 | Status: SHIPPED | OUTPATIENT
Start: 2022-12-02

## 2022-12-02 RX ORDER — ERGOCALCIFEROL (VITAMIN D2) 1250 MCG
50000 CAPSULE ORAL
Qty: 24 CAPSULE | Refills: 1 | Status: SHIPPED | OUTPATIENT
Start: 2022-12-05

## 2022-12-02 NOTE — TELEPHONE ENCOUNTER
Request for refills of Vitamin D, Vitamin B12, and pantoprazole to [facility] in [place]; patient scheduled labs and follow up in January; I advised patient that pantoprazole was not on current med list but patient indicates that is what she has been taking

## 2022-12-05 NOTE — TELEPHONE ENCOUNTER
MRI brain was done at [address] on 11/30/22. OUTSIDE MRI at [address] compared to previous:::       Stable - no new lesion. No therapeutic change to recommend. I called and spoke with the pt, advised her of the MRI results.

## 2022-12-08 NOTE — PROGRESS NOTES
REBECCA Lawrence is a 61 y.o. female seen for external vaginal itching mainly when she urinates. She denies discharge. She thinks she may have seen blood in her urine. She took 2 Diflucan but this did not help. Past Medical History, Past Surgical History, Family history, Social History, and Medications were all reviewed with the patient today and updated as necessary. Current Outpatient Medications   Medication Sig    fluconazole (DIFLUCAN) 150 MG tablet Take one today and repeat in 4 days    clobetasol (TEMOVATE) 0.05 % cream Apply topically 3 times daily. ergocalciferol (ERGOCALCIFEROL) 1.25 MG (72292 UT) capsule Take 1 capsule by mouth Twice a Week    cyanocobalamin 1000 MCG/ML injection Inject 1 mL into the muscle every 30 days    meloxicam (MOBIC) 15 MG tablet     atorvastatin (LIPITOR) 10 MG tablet Take 10 mg by mouth daily ON HOLD    gabapentin (NEURONTIN) 100 MG capsule Take 1 capsule by mouth 4 times daily for 180 days. Intended supply: 30 days    ALPRAZolam (XANAX) 0.5 MG tablet Take 2 tablets by mouth once as needed for Anxiety (MRI) for up to 1 dose. omeprazole-sodium bicarbonate (ZEGERID)  MG per capsule Take 1 capsule by mouth    EPINEPHrine (EPIPEN) 0.3 MG/0.3ML SOAJ injection USE AS DIRECTED AS NEEDED    loratadine (CLARITIN) 10 MG tablet TAKE 1 TABLET BY MOUTH EVERY DAY     No current facility-administered medications for this visit. Allergies   Allergen Reactions    Latex Itching    Aspirin Swelling and Angioedema    Casein Hives    Dimethyl Fumarate Other (See Comments)     Hip pain severe dissipated on d/c Tecfidera.     Palpitations     Interferons Other (See Comments)     Copaxone caused SEIZURES    Metronidazole Shortness Of Breath    Nitroglycerin Itching and Rash     Blisters & skin peeling    Shellfish-Derived Products Hives     Pt has never had a reaction to IV contrast Dye  Pt has never had a reaction to IV contrast Dye       Past Medical History: Diagnosis Date    Anxiety     Autoimmune disease ([facility]     MS    Endometriosis     Environmental and seasonal allergies     Family history of colonic polyps     mother    Fever blister     GERD (gastroesophageal reflux disease)     controlled with medication     H/O echocardiogram 2017    EF 55-60%    History of palpitations     r/t medication     Internal hemorrhoids without mention of complication 6551    Multiple sclerosis ([facility]     Followed by Dr. Estee Delgado, no medication at this time (19)    Ovarian cyst     Personal history of colonic polyps     TVA, TA    Rectocele     S/P cataract extraction and insertion of intraocular lens 3/3/2016     Past Surgical History:   Procedure Laterality Date    CATARACT REMOVAL Bilateral     with iol     SECTION      COLONOSCOPY  2014, 2017 -[facility]--1.5 cm sigmoid TVA, 5 small asc TAs--3 year recall;  -- mixed TVA    DILATION AND CURETTAGE OF UTERUS  2019    ENDOMETRIAL BIOPSY       benign    PELVIC LAPAROSCOPY      laproscopic for endometriosis x14     Family History   Problem Relation Age of Onset    Hypertension Mother     Hypertension Father     Diabetes Father     Heart Disease Father     Elevated Lipids Father     Coronary Art Dis Father         stents x2    Diabetes Brother         uncontrolled    Elevated Lipids Brother     Hypertension Brother     Stroke Brother         also had a brain bleed but unknown cause    Breast Cancer Paternal Aunt     Heart Disease Maternal Grandfather     Stroke Paternal Grandmother     Heart Disease Paternal Grandmother     Breast Cancer Maternal Grandmother 79      Social History     Tobacco Use    Smoking status: Former     Packs/day: 0.50     Years: 17.00     Pack years: 8.50     Types: Cigarettes     Quit date: 2001     Years since quittin.9    Smokeless tobacco: Never   Substance Use Topics    Alcohol use: No       Social History     Substance and Sexual Activity   Sexual Activity Not Currently     OB History    Para Term  AB Living   1 1 0 0 0 0   SAB IAB Ectopic Molar Multiple Live Births   0 0 0 0 0 0      # Outcome Date GA Lbr Zan/2nd Weight Sex Delivery Anes PTL Lv   1 Para               Obstetric Comments          Health Maintenance  Mammogram:   Colonoscopy:   Bone Density:    ROS:    Review of Systems  General: Not Present- Chills, Fever, Fatigue, Insomnia, Hot flashes/Night sweats, Weight gain  Skin: Not Present- Bruising, Change in Wart/Mole, Excessive Sweating, Itching, Nail Changes, New Lesions, Rash, Skin Color Changes and Ulcer. HEENT: Not Present- Headache, Blurred Vision, Double Vision, Glaucoma, Visual Disturbances, Hearing Loss, Ringing in the Ears, Vertigo, Nose Bleed, Bleeding Gums, Hoarseness and Sore Throat. Neck: Not Present- Neck Pain and Neck Swelling. Respiratory: Not Present- Cough, Difficulty Breathing and Difficulty Breathing on Exertion. Breast: Not Present- Breast Mass, Breast Pain, Breast Swelling, Nipple Discharge, Nipple Pain, Recent Breast Size Changes and Skin Changes. Cardiovascular: Not Present- Abnormal Blood Pressure, Chest Pain, Edema, Fainting / Blacking Out, Palpitations, Shortness of Breath and Swelling of Extremities. Gastrointestinal: Not Present- Abdominal Pain, Abdominal Swelling, Bloating, Change in Bowel Habits, Constipation, Diarrhea, Difficulty Swallowing, Gets full quickly at meals, Nausea, Rectal Bleeding and Vomiting. Female Genitourinary: Not Present- Dysmenorrhea, Dyspareunia, Decreased libido, Excessive Menstrual Bleeding, Menstrual Irregularities, Pelvic Pain, Urinary Complaints, Vaginal Discharge, Vaginal itching/burning, Vaginal odor  Musculoskeletal: Not Present- Joint Pain and Muscle Pain. Neurological: Not Present- Dizziness, Fainting, Headaches and Seizures. Psychiatric: Not Present- Anxiety, Depression, Mood changes and Panic Attacks.   Endocrine: Not Present- Appetite Changes, Cold Intolerance, Excessive Thirst, Excessive Urination and Heat Intolerance. Hematology: Not Present- Abnormal Bleeding, Easy Bruising and Enlarged Lymph Nodes. PHYSICAL EXAM:    /88 (Position: Sitting)   Ht 5' 6\" (1.676 m)   Wt 178 lb (80.7 kg)   BMI 28.73 kg/m²     Physical Exam   General   Mental Status - Alert. General Appearance - Cooperative. Abdomen   Inspection: - Inspection Normal.   Palpation/Percussion: Palpation and Percussion of the abdomen reveal - Non Tender, No Rebound tenderness, No Rigidity (guarding), No hepatosplenomegaly, No Palpable abdominal masses and Soft. Auscultation: Auscultation of the abdomen reveals - Bowel sounds normal.     Female Genitourinary     External Genitalia  Slightly red; mild fissuring at the introitus  Vulva: - Normal. Perineum - Normal. Bartholin's Gland - Bilateral - Normal. Clitoris - Normal.   Introitus: Characteristics - Normal.   Urethra: Characteristics - Normal.     Speculum & Bimanual   Vagina: Vaginal Mucosa - Normal.   Vaginal Wall: - Normal.   Vaginal Lesions - None. Cervix: Characteristics - Normal.   Uterus: Characteristics - Normal.   Adnexa: - Normal.   Bladder - Normal.     Lymphatics  No cervical, axillary or groin adenopathy            Medical problems and test results were reviewed with the patient today. ASSESSMENT and PLAN    1. Vaginal itching  -     Smear, Wet Mount, Saline (02024)  -     fluconazole (DIFLUCAN) 150 MG tablet; Take one today and repeat in 4 days, Disp-2 tablet, R-5Normal  2. Skin fissures  -     clobetasol (TEMOVATE) 0.05 % cream; Apply topically 3 times daily. , Disp-60 g, R-5, Normal  3.  Hematuria, unspecified type  -     AMB POC URINALYSIS DIP STICK AUTO W/O MICRO  4. Leukocytes in urine  -     Culture, Urine           Time:  I spent  30 minutes in preparing to see patient (including chart review and preparation), obtaining and/or reviewing additional medical history, performing a physical exam and evaluation, documenting clinical information in the electronic health record, independently interpreting results, communicating results to patient, family or caregiver, and/or coordinating care. No follow-ups on file.        Sabrina Crawford MD

## 2022-12-09 ENCOUNTER — OFFICE VISIT (OUTPATIENT)
Dept: GYNECOLOGY | Age: 57
End: 2022-12-09

## 2022-12-09 VITALS
WEIGHT: 178 LBS | DIASTOLIC BLOOD PRESSURE: 88 MMHG | HEIGHT: 66 IN | BODY MASS INDEX: 28.61 KG/M2 | SYSTOLIC BLOOD PRESSURE: 130 MMHG

## 2022-12-09 DIAGNOSIS — N89.8 VAGINAL ITCHING: Primary | ICD-10-CM

## 2022-12-09 DIAGNOSIS — R23.4 SKIN FISSURES: ICD-10-CM

## 2022-12-09 DIAGNOSIS — R82.998 LEUKOCYTES IN URINE: ICD-10-CM

## 2022-12-09 DIAGNOSIS — R31.9 HEMATURIA, UNSPECIFIED TYPE: ICD-10-CM

## 2022-12-09 LAB
BILIRUBIN, URINE, POC: NEGATIVE
BLOOD URINE, POC: NORMAL
GLUCOSE URINE, POC: NEGATIVE
KETONES, URINE, POC: NEGATIVE
LEUKOCYTE ESTERASE, URINE, POC: NORMAL
NITRITE, URINE, POC: NORMAL
PH, URINE, POC: 6 (ref 4.6–8)
PROTEIN,URINE, POC: NEGATIVE
SPECIFIC GRAVITY, URINE, POC: 1.02 (ref 1–1.03)
URINALYSIS CLARITY, POC: CLEAR
URINALYSIS COLOR, POC: CLEAR
UROBILINOGEN, POC: NORMAL
WET PREP (POC): NORMAL

## 2022-12-09 RX ORDER — CLOBETASOL PROPIONATE 0.5 MG/G
CREAM TOPICAL
Qty: 60 G | Refills: 5 | Status: SHIPPED | OUTPATIENT
Start: 2022-12-09

## 2022-12-09 RX ORDER — FLUCONAZOLE 150 MG/1
TABLET ORAL
Qty: 2 TABLET | Refills: 5 | Status: SHIPPED | OUTPATIENT
Start: 2022-12-09

## 2022-12-11 LAB
BACTERIA SPEC CULT: NORMAL
SERVICE CMNT-IMP: NORMAL

## 2022-12-13 ENCOUNTER — TRANSCRIBE ORDERS (OUTPATIENT)
Dept: SCHEDULING | Age: 57
End: 2022-12-13

## 2022-12-13 DIAGNOSIS — Z12.31 VISIT FOR SCREENING MAMMOGRAM: Primary | ICD-10-CM

## 2022-12-30 ENCOUNTER — HOSPITAL ENCOUNTER (OUTPATIENT)
Dept: MAMMOGRAPHY | Age: 57
Discharge: HOME | End: 2022-12-30
Payer: COMMERCIAL

## 2022-12-30 DIAGNOSIS — Z12.31 VISIT FOR SCREENING MAMMOGRAM: ICD-10-CM

## 2022-12-30 PROCEDURE — 77067 SCR MAMMO BI INCL CAD: CPT

## 2023-01-03 DIAGNOSIS — I10 HYPERTENSION, UNSPECIFIED TYPE: ICD-10-CM

## 2023-01-03 DIAGNOSIS — E78.00 PURE HYPERCHOLESTEROLEMIA, UNSPECIFIED: ICD-10-CM

## 2023-01-03 DIAGNOSIS — E53.8 VITAMIN B12 DEFICIENCY: Primary | ICD-10-CM

## 2023-01-03 DIAGNOSIS — E78.00 PURE HYPERCHOLESTEROLEMIA: ICD-10-CM

## 2023-01-05 DIAGNOSIS — E78.00 PURE HYPERCHOLESTEROLEMIA: ICD-10-CM

## 2023-01-05 DIAGNOSIS — E53.8 VITAMIN B12 DEFICIENCY: ICD-10-CM

## 2023-01-05 DIAGNOSIS — I10 HYPERTENSION, UNSPECIFIED TYPE: ICD-10-CM

## 2023-01-05 LAB
ALBUMIN SERPL-MCNC: 4 G/DL (ref 3.5–5)
ALBUMIN/GLOB SERPL: 1.2 (ref 0.4–1.6)
ALP SERPL-CCNC: 60 U/L (ref 50–136)
ALT SERPL-CCNC: 33 U/L (ref 12–65)
ANION GAP SERPL CALC-SCNC: 4 MMOL/L (ref 2–11)
AST SERPL-CCNC: 13 U/L (ref 15–37)
BASOPHILS # BLD: 0.1 K/UL (ref 0–0.2)
BASOPHILS NFR BLD: 1 % (ref 0–2)
BILIRUB SERPL-MCNC: 0.5 MG/DL (ref 0.2–1.1)
BUN SERPL-MCNC: 10 MG/DL (ref 6–23)
CALCIUM SERPL-MCNC: 9.3 MG/DL (ref 8.3–10.4)
CHLORIDE SERPL-SCNC: 110 MMOL/L (ref 101–110)
CHOLEST SERPL-MCNC: 234 MG/DL
CO2 SERPL-SCNC: 25 MMOL/L (ref 21–32)
CREAT SERPL-MCNC: 0.9 MG/DL (ref 0.6–1)
DIFFERENTIAL METHOD BLD: ABNORMAL
EOSINOPHIL # BLD: 0.1 K/UL (ref 0–0.8)
EOSINOPHIL NFR BLD: 2 % (ref 0.5–7.8)
ERYTHROCYTE [DISTWIDTH] IN BLOOD BY AUTOMATED COUNT: 11.7 % (ref 11.9–14.6)
GLOBULIN SER CALC-MCNC: 3.4 G/DL (ref 2.8–4.5)
GLUCOSE SERPL-MCNC: 92 MG/DL (ref 65–100)
HCT VFR BLD AUTO: 47 % (ref 35.8–46.3)
HDLC SERPL-MCNC: 64 MG/DL (ref 40–60)
HDLC SERPL: 3.7
HGB BLD-MCNC: 15.3 G/DL (ref 11.7–15.4)
IMM GRANULOCYTES # BLD AUTO: 0 K/UL (ref 0–0.5)
IMM GRANULOCYTES NFR BLD AUTO: 0 % (ref 0–5)
LDLC SERPL CALC-MCNC: 147 MG/DL
LYMPHOCYTES # BLD: 1.8 K/UL (ref 0.5–4.6)
LYMPHOCYTES NFR BLD: 34 % (ref 13–44)
MCH RBC QN AUTO: 29.7 PG (ref 26.1–32.9)
MCHC RBC AUTO-ENTMCNC: 32.6 G/DL (ref 31.4–35)
MCV RBC AUTO: 91.1 FL (ref 82–102)
MONOCYTES # BLD: 0.4 K/UL (ref 0.1–1.3)
MONOCYTES NFR BLD: 7 % (ref 4–12)
NEUTS SEG # BLD: 3 K/UL (ref 1.7–8.2)
NEUTS SEG NFR BLD: 56 % (ref 43–78)
NRBC # BLD: 0 K/UL (ref 0–0.2)
PLATELET # BLD AUTO: 250 K/UL (ref 150–450)
PMV BLD AUTO: 9.8 FL (ref 9.4–12.3)
POTASSIUM SERPL-SCNC: 4.5 MMOL/L (ref 3.5–5.1)
PROT SERPL-MCNC: 7.4 G/DL (ref 6.3–8.2)
RBC # BLD AUTO: 5.16 M/UL (ref 4.05–5.2)
SODIUM SERPL-SCNC: 139 MMOL/L (ref 133–143)
TRIGL SERPL-MCNC: 115 MG/DL (ref 35–150)
TSH, 3RD GENERATION: 1.45 UIU/ML (ref 0.36–3.74)
VIT B12 SERPL-MCNC: 599 PG/ML (ref 193–986)
VLDLC SERPL CALC-MCNC: 23 MG/DL (ref 6–23)
WBC # BLD AUTO: 5.4 K/UL (ref 4.3–11.1)

## 2023-01-13 ENCOUNTER — OFFICE VISIT (OUTPATIENT)
Dept: INTERNAL MEDICINE CLINIC | Facility: CLINIC | Age: 58
End: 2023-01-13
Payer: COMMERCIAL

## 2023-01-13 VITALS
HEART RATE: 74 BPM | DIASTOLIC BLOOD PRESSURE: 76 MMHG | OXYGEN SATURATION: 99 % | SYSTOLIC BLOOD PRESSURE: 136 MMHG | BODY MASS INDEX: 28.83 KG/M2 | WEIGHT: 178.6 LBS

## 2023-01-13 DIAGNOSIS — F41.9 ANXIETY: Primary | ICD-10-CM

## 2023-01-13 DIAGNOSIS — F41.8 ANXIETY ABOUT HEALTH: ICD-10-CM

## 2023-01-13 DIAGNOSIS — E78.00 PURE HYPERCHOLESTEROLEMIA: ICD-10-CM

## 2023-01-13 DIAGNOSIS — R93.1 ELEVATED CORONARY ARTERY CALCIUM SCORE: ICD-10-CM

## 2023-01-13 PROCEDURE — 99214 OFFICE O/P EST MOD 30 MIN: CPT | Performed by: NURSE PRACTITIONER

## 2023-01-13 RX ORDER — ESCITALOPRAM OXALATE 5 MG/1
5 TABLET ORAL
Qty: 30 TABLET | Refills: 5 | Status: SHIPPED | OUTPATIENT
Start: 2023-01-13

## 2023-01-13 ASSESSMENT — PATIENT HEALTH QUESTIONNAIRE - PHQ9
SUM OF ALL RESPONSES TO PHQ QUESTIONS 1-9: 0
SUM OF ALL RESPONSES TO PHQ QUESTIONS 1-9: 0
2. FEELING DOWN, DEPRESSED OR HOPELESS: 0
1. LITTLE INTEREST OR PLEASURE IN DOING THINGS: 0
SUM OF ALL RESPONSES TO PHQ QUESTIONS 1-9: 0
SUM OF ALL RESPONSES TO PHQ QUESTIONS 1-9: 0
SUM OF ALL RESPONSES TO PHQ9 QUESTIONS 1 & 2: 0

## 2023-01-13 NOTE — PROGRESS NOTES
PROGRESS NOTE    SUBJECTIVE:   Toshia Martinez is a 61 y.o. female seen for a follow up visit for   Chief Complaint   Patient presents with    Follow-up Chronic Condition     Follow up with labs     HLD with mild elevation of the CCS 29 in LAD. Palpitations -- wake up at night time with heart racing and/or pounding. Tremor -- Dr. Steve Prasad started gabapentin for tremors but did not start the gabapentin. Hiatal hernia -- GI told would not cause the previous chest tightness. Anxiety -- when at neuologist office anxious related hallway tile that worsened balance. Reviewed and updated this visit by provider:  Tobacco  Allergies  Meds  Problems  Med Hx  Surg Hx  Fam Hx         Review of Systems   Constitutional:  Negative for chills, fatigue and fever. Respiratory:  Negative for chest tightness, shortness of breath and wheezing. Cardiovascular:  Positive for palpitations (with anxiety). Negative for chest pain and leg swelling. Gastrointestinal:  Negative for abdominal pain. Endocrine: Negative for polydipsia, polyphagia and polyuria. Genitourinary:  Negative for frequency. Musculoskeletal:  Negative for gait problem. Skin:  Negative for rash. Neurological:  Positive for tremors. Negative for weakness, numbness and headaches. Psychiatric/Behavioral:  Negative for dysphoric mood. The patient is nervous/anxious (including anxiety about CCS). OBJECTIVE:    /76 (Site: Left Upper Arm, Position: Sitting, Cuff Size: Small Adult)   Pulse 74   Wt 178 lb 9.6 oz (81 kg)   SpO2 99%   BMI 28.83 kg/m²      Physical Exam  Vitals and nursing note reviewed. Constitutional:       Appearance: Normal appearance. She is well-groomed. HENT:      Head: Normocephalic. Right Ear: Hearing and external ear normal.      Left Ear: Hearing and external ear normal.      Mouth/Throat:      Lips: Pink.       Mouth: Mucous membranes are moist.   Eyes:      General: Lids are normal. Conjunctiva/sclera: Conjunctivae normal.   Neck:      Vascular: Carotid bruit present. Cardiovascular:      Rate and Rhythm: Normal rate. Pulmonary:      Effort: Pulmonary effort is normal.   Musculoskeletal:      Right lower leg: No edema. Left lower leg: No edema. Skin:     General: Skin is warm and dry. Findings: No rash. Neurological:      Mental Status: She is alert and oriented to person, place, and time. Cranial Nerves: No dysarthria or facial asymmetry. Motor: Motor function is intact. Gait: Gait is intact. Gait normal.   Psychiatric:         Attention and Perception: Attention normal.         Mood and Affect: Mood is anxious. Behavior: Behavior normal. Behavior is cooperative. ASSESSMENT and PLAN    1. Anxiety  -     escitalopram (LEXAPRO) 5 MG tablet; Take 1 tablet by mouth nightly, Disp-30 tablet, R-5Normal  2. Anxiety about health  -     escitalopram (LEXAPRO) 5 MG tablet; Take 1 tablet by mouth nightly, Disp-30 tablet, R-5Normal  3. Pure hypercholesterolemia  -     Comprehensive Metabolic Panel; Future  -     Lipid Panel; Future  4. Elevated coronary artery calcium score  -     Comprehensive Metabolic Panel; Future  -     Lipid Panel; Future    Return in about 3 months (around 4/13/2023) for Follow-Up anxiety, HLD with lab.  the following changes in treatment are made: as above. Initiate escitalopram for anxiety. lab results and schedule of future lab studies reviewed with patient  Dietary modifications to reduce saturated fats in diet. Recheck lipids. Consider intervention especially if LDL > 160. reviewed diet, exercise and weight control  cardiovascular risk and specific lipid/LDL goals reviewed  reviewed medications and side effects in detail      CHRISTOPHER Prieto NP    Dictated using voice recognition software.  Proofread, but unrecognized voice recognition errors may exist.

## 2023-01-19 ENCOUNTER — TELEPHONE (OUTPATIENT)
Dept: INTERNAL MEDICINE CLINIC | Facility: CLINIC | Age: 58
End: 2023-01-19

## 2023-01-19 DIAGNOSIS — F41.8 ANXIETY ABOUT HEALTH: ICD-10-CM

## 2023-01-19 DIAGNOSIS — F41.9 CHRONIC ANXIETY: Primary | ICD-10-CM

## 2023-01-19 NOTE — TELEPHONE ENCOUNTER
----- Message from Angelia Bence sent at 1/19/2023  3:02 PM EST -----  Subject: Message to Provider    QUESTIONS  Information for Provider? patient called to see if there any other meds   for anxiety other than what she is taking and would like a referral to   [facility] [id number]  ---------------------------------------------------------------------------  --------------  [id number] [employer]  [phone]; OK to leave message on voicemail  ---------------------------------------------------------------------------  --------------  SCRIPT ANSWERS  Relationship to Patient?  Self

## 2023-01-20 NOTE — TELEPHONE ENCOUNTER
Daughter goes to [facility] and had DNA tested to determine which medicine would work for her depression/anxiety. She would like a referral to [facility] also. She states she has multiple triggers that can increase her anxiety.   Will place referral.

## 2023-01-25 ENCOUNTER — TELEPHONE (OUTPATIENT)
Dept: INTERNAL MEDICINE CLINIC | Facility: CLINIC | Age: 58
End: 2023-01-25

## 2023-01-25 NOTE — TELEPHONE ENCOUNTER
----- Message from Brenden Zabala sent at 1/19/2023  3:02 PM EST -----  Subject: Message to Provider    QUESTIONS  Information for Provider? patient called to see if there any other meds   for anxiety other than what she is taking and would like a referral to   [facility] [id number] [phone] [id number]  ---------------------------------------------------------------------------  --------------  [id number] [employer]"  [phone]; OK to leave message on voicemail  ---------------------------------------------------------------------------  --------------  SCRIPT ANSWERS  Relationship to Patient?  Self

## 2023-02-01 ASSESSMENT — ENCOUNTER SYMPTOMS
ABDOMINAL PAIN: 0
CHEST TIGHTNESS: 0
SHORTNESS OF BREATH: 0
WHEEZING: 0

## 2023-02-06 ENCOUNTER — PATIENT MESSAGE (OUTPATIENT)
Dept: INTERNAL MEDICINE CLINIC | Facility: CLINIC | Age: 58
End: 2023-02-06

## 2023-02-21 ENCOUNTER — OFFICE VISIT (OUTPATIENT)
Dept: INTERNAL MEDICINE CLINIC | Facility: CLINIC | Age: 58
End: 2023-02-21
Payer: COMMERCIAL

## 2023-02-21 VITALS
SYSTOLIC BLOOD PRESSURE: 122 MMHG | OXYGEN SATURATION: 98 % | HEART RATE: 74 BPM | DIASTOLIC BLOOD PRESSURE: 78 MMHG | BODY MASS INDEX: 28.6 KG/M2 | WEIGHT: 177.2 LBS

## 2023-02-21 DIAGNOSIS — F41.9 ANXIETY: Primary | ICD-10-CM

## 2023-02-21 PROCEDURE — 99214 OFFICE O/P EST MOD 30 MIN: CPT | Performed by: NURSE PRACTITIONER

## 2023-02-21 RX ORDER — ATENOLOL 25 MG/1
25 TABLET ORAL DAILY
Qty: 30 TABLET | Refills: 3 | Status: SHIPPED | OUTPATIENT
Start: 2023-02-21

## 2023-02-21 SDOH — ECONOMIC STABILITY: FOOD INSECURITY: WITHIN THE PAST 12 MONTHS, YOU WORRIED THAT YOUR FOOD WOULD RUN OUT BEFORE YOU GOT MONEY TO BUY MORE.: NEVER TRUE

## 2023-02-21 SDOH — ECONOMIC STABILITY: HOUSING INSECURITY
IN THE LAST 12 MONTHS, WAS THERE A TIME WHEN YOU DID NOT HAVE A STEADY PLACE TO SLEEP OR SLEPT IN A SHELTER (INCLUDING NOW)?: NO

## 2023-02-21 SDOH — ECONOMIC STABILITY: FOOD INSECURITY: WITHIN THE PAST 12 MONTHS, THE FOOD YOU BOUGHT JUST DIDN'T LAST AND YOU DIDN'T HAVE MONEY TO GET MORE.: NEVER TRUE

## 2023-02-21 SDOH — ECONOMIC STABILITY: INCOME INSECURITY: HOW HARD IS IT FOR YOU TO PAY FOR THE VERY BASICS LIKE FOOD, HOUSING, MEDICAL CARE, AND HEATING?: NOT VERY HARD

## 2023-02-21 ASSESSMENT — PATIENT HEALTH QUESTIONNAIRE - PHQ9
SUM OF ALL RESPONSES TO PHQ QUESTIONS 1-9: 0
SUM OF ALL RESPONSES TO PHQ QUESTIONS 1-9: 0
SUM OF ALL RESPONSES TO PHQ9 QUESTIONS 1 & 2: 0
SUM OF ALL RESPONSES TO PHQ QUESTIONS 1-9: 0
2. FEELING DOWN, DEPRESSED OR HOPELESS: 0
1. LITTLE INTEREST OR PLEASURE IN DOING THINGS: 0
SUM OF ALL RESPONSES TO PHQ QUESTIONS 1-9: 0

## 2023-02-21 NOTE — PROGRESS NOTES
PROGRESS NOTE    SUBJECTIVE:   William Hall is a 60 y.o. female seen for a follow up visit for   Chief Complaint   Patient presents with    Hypertension     HPI    Anxiety -- heart rate randomly elevates. Measured BP at home -- 150/88  doesn't remember pulse. Heart rate elevates. Can hear the heart rate in ear. Did not tolerate lexapro. Last night heart rate of 125 around 4 am had symptoms of feeling cold but did not get up. \"My watch recorded it. \"           Reviewed and updated this visit by provider:  Tobacco  Allergies  Meds  Problems  Med Hx  Surg Hx  Fam Hx         Review of Systems   Psychiatric/Behavioral:  The patient is nervous/anxious. OBJECTIVE:    /78 (Site: Left Upper Arm, Position: Sitting, Cuff Size: Small Adult)   Pulse 74   Wt 177 lb 3.2 oz (80.4 kg)   SpO2 98%   BMI 28.60 kg/m²      Physical Exam  Vitals and nursing note reviewed. Constitutional:       Appearance: Normal appearance. She is well-groomed. HENT:      Head: Normocephalic. Right Ear: Hearing and external ear normal.      Left Ear: Hearing and external ear normal.      Mouth/Throat:      Lips: Pink. Mouth: Mucous membranes are moist.   Eyes:      General: Lids are normal.      Conjunctiva/sclera: Conjunctivae normal.   Neck:      Vascular: No carotid bruit. Cardiovascular:      Rate and Rhythm: Normal rate and regular rhythm. No extrasystoles are present. Heart sounds: Normal heart sounds. Comments: Heart rate 90. Pulmonary:      Effort: Pulmonary effort is normal.      Breath sounds: Normal breath sounds. Musculoskeletal:      Right lower leg: No edema. Left lower leg: No edema. Skin:     General: Skin is warm and dry. Findings: No rash. Neurological:      Mental Status: She is alert and oriented to person, place, and time. Cranial Nerves: No dysarthria or facial asymmetry. Motor: Motor function is intact. Gait: Gait is intact.  Gait normal. Psychiatric:         Attention and Perception: Attention normal.         Mood and Affect: Mood is anxious. Speech: Speech is rapid and pressured. Behavior: Behavior normal. Behavior is cooperative. Comments: Alber Stage is scattered at times. ASSESSMENT and PLAN    1. Anxiety  -     atenolol (TENORMIN) 25 MG tablet; Take 1 tablet by mouth daily, Disp-30 tablet, R-3Normal    Return in about 3 weeks (around 3/14/2023) for Follow-Up anxiety. the following changes in treatment are made: add low dose atenolol. reviewed medications and side effects in detail    On this date 02/21/23 I have spent 32 minutes reviewing previous notes, test results and face to face with the patient. Over 50% of today's office visit was spent in face to face time in counseling reviewing test results, importance of compliance, education about disease process, benefits and side-effects of medications and follow-up plan. CHRISTOPHER Ramesh NP    Dictated using voice recognition software.  Proofread, but unrecognized voice recognition errors may exist.

## 2023-03-17 ENCOUNTER — TELEPHONE (OUTPATIENT)
Dept: INTERNAL MEDICINE CLINIC | Facility: CLINIC | Age: 58
End: 2023-03-17

## 2023-03-17 NOTE — TELEPHONE ENCOUNTER
Pt no longer taking atenolol due to the way it made her feel and dropping her HR <45. Pt requested Mon 3/20 appt to be canceled and she will call back to reschedule. Pt requesting f/u from PCP, from prior conversation, regarding insurance and if her blood work will be covered.

## 2023-03-17 NOTE — TELEPHONE ENCOUNTER
----- Message from Gaby Boykin sent at 3/16/2023  1:53 PM EDT -----  Subject: Message to Provider    QUESTIONS  Information for Provider? pt would like to know if she should keep appt   for 3/20, due to not liking the medication and is also asking if she   should still get bloodwork. please call pt.   ---------------------------------------------------------------------------  --------------  Ros GAMEZ  [phone]; OK to leave message on voicemail  ---------------------------------------------------------------------------  --------------  SCRIPT ANSWERS  Relationship to Patient?  Self

## 2023-04-17 ENCOUNTER — TELEPHONE (OUTPATIENT)
Dept: INTERNAL MEDICINE CLINIC | Facility: CLINIC | Age: 58
End: 2023-04-17

## 2023-04-17 NOTE — TELEPHONE ENCOUNTER
----- Message from Shivam Walker MA sent at 4/17/2023  1:14 PM EDT -----  Subject: Message to Provider    QUESTIONS  Information for Provider? Pt called requesting lab orders to have drawn   prior to her 4/21 office visit. Pt requesting hormone levels to be checked   as well.   ---------------------------------------------------------------------------  --------------  Kirby PRUITT  [phone]; OK to leave message on voicemail  ---------------------------------------------------------------------------  --------------  SCRIPT ANSWERS  Relationship to Patient?  Self

## 2023-04-19 DIAGNOSIS — R93.1 ELEVATED CORONARY ARTERY CALCIUM SCORE: ICD-10-CM

## 2023-04-19 DIAGNOSIS — E78.00 PURE HYPERCHOLESTEROLEMIA: ICD-10-CM

## 2023-04-19 LAB
ALBUMIN SERPL-MCNC: 3.9 G/DL (ref 3.5–5)
ALBUMIN/GLOB SERPL: 1.1 (ref 0.4–1.6)
ALP SERPL-CCNC: 65 U/L (ref 50–136)
ALT SERPL-CCNC: 27 U/L (ref 12–65)
ANION GAP SERPL CALC-SCNC: 6 MMOL/L (ref 2–11)
AST SERPL-CCNC: 13 U/L (ref 15–37)
BILIRUB SERPL-MCNC: 0.6 MG/DL (ref 0.2–1.1)
BUN SERPL-MCNC: 10 MG/DL (ref 6–23)
CALCIUM SERPL-MCNC: 9.6 MG/DL (ref 8.3–10.4)
CHLORIDE SERPL-SCNC: 109 MMOL/L (ref 101–110)
CHOLEST SERPL-MCNC: 223 MG/DL
CO2 SERPL-SCNC: 26 MMOL/L (ref 21–32)
CREAT SERPL-MCNC: 0.8 MG/DL (ref 0.6–1)
GLOBULIN SER CALC-MCNC: 3.5 G/DL (ref 2.8–4.5)
GLUCOSE SERPL-MCNC: 83 MG/DL (ref 65–100)
HDLC SERPL-MCNC: 58 MG/DL (ref 40–60)
HDLC SERPL: 3.8
LDLC SERPL CALC-MCNC: 143.4 MG/DL
POTASSIUM SERPL-SCNC: 4 MMOL/L (ref 3.5–5.1)
PROT SERPL-MCNC: 7.4 G/DL (ref 6.3–8.2)
SODIUM SERPL-SCNC: 141 MMOL/L (ref 133–143)
TRIGL SERPL-MCNC: 108 MG/DL (ref 35–150)
VLDLC SERPL CALC-MCNC: 21.6 MG/DL (ref 6–23)

## 2023-04-21 ENCOUNTER — OFFICE VISIT (OUTPATIENT)
Dept: INTERNAL MEDICINE CLINIC | Facility: CLINIC | Age: 58
End: 2023-04-21
Payer: COMMERCIAL

## 2023-04-21 VITALS
DIASTOLIC BLOOD PRESSURE: 78 MMHG | HEART RATE: 65 BPM | SYSTOLIC BLOOD PRESSURE: 128 MMHG | OXYGEN SATURATION: 98 % | WEIGHT: 179 LBS | BODY MASS INDEX: 28.89 KG/M2

## 2023-04-21 DIAGNOSIS — F41.9 ANXIETY: Primary | ICD-10-CM

## 2023-04-21 DIAGNOSIS — Z92.29 HISTORY OF POSTMENOPAUSAL HRT: ICD-10-CM

## 2023-04-21 DIAGNOSIS — E78.00 PURE HYPERCHOLESTEROLEMIA: ICD-10-CM

## 2023-04-21 PROCEDURE — 99214 OFFICE O/P EST MOD 30 MIN: CPT | Performed by: NURSE PRACTITIONER

## 2023-04-21 RX ORDER — ATENOLOL 25 MG/1
25 TABLET ORAL DAILY
Qty: 30 TABLET | Refills: 3 | Status: CANCELLED | OUTPATIENT
Start: 2023-04-21

## 2023-04-21 ASSESSMENT — PATIENT HEALTH QUESTIONNAIRE - PHQ9
2. FEELING DOWN, DEPRESSED OR HOPELESS: 0
1. LITTLE INTEREST OR PLEASURE IN DOING THINGS: 0
SUM OF ALL RESPONSES TO PHQ QUESTIONS 1-9: 0
SUM OF ALL RESPONSES TO PHQ QUESTIONS 1-9: 0
SUM OF ALL RESPONSES TO PHQ9 QUESTIONS 1 & 2: 0
SUM OF ALL RESPONSES TO PHQ QUESTIONS 1-9: 0
SUM OF ALL RESPONSES TO PHQ QUESTIONS 1-9: 0

## 2023-04-25 DIAGNOSIS — E78.00 PURE HYPERCHOLESTEROLEMIA, UNSPECIFIED: ICD-10-CM

## 2023-04-25 RX ORDER — ATORVASTATIN CALCIUM 10 MG/1
TABLET, FILM COATED ORAL
Qty: 90 TABLET | Refills: 3 | OUTPATIENT
Start: 2023-04-25

## 2023-05-04 ASSESSMENT — ENCOUNTER SYMPTOMS
ABDOMINAL PAIN: 0
SHORTNESS OF BREATH: 0
CHEST TIGHTNESS: 0
WHEEZING: 0
COUGH: 0
COLOR CHANGE: 0

## 2023-05-06 DIAGNOSIS — F41.9 ANXIETY: ICD-10-CM

## 2023-05-08 RX ORDER — ATENOLOL 25 MG/1
TABLET ORAL
Qty: 90 TABLET | Refills: 1 | OUTPATIENT
Start: 2023-05-08

## 2023-05-19 DIAGNOSIS — F41.9 ANXIETY: ICD-10-CM

## 2023-05-19 RX ORDER — ATENOLOL 25 MG/1
TABLET ORAL
Qty: 90 TABLET | Refills: 1 | OUTPATIENT
Start: 2023-05-19

## 2023-06-12 RX ORDER — ATORVASTATIN CALCIUM 10 MG/1
TABLET, FILM COATED ORAL
Qty: 90 TABLET | Refills: 3 | OUTPATIENT
Start: 2023-06-12

## 2023-07-10 ENCOUNTER — OFFICE VISIT (OUTPATIENT)
Dept: NEUROLOGY | Age: 58
End: 2023-07-10
Payer: COMMERCIAL

## 2023-07-10 VITALS
DIASTOLIC BLOOD PRESSURE: 86 MMHG | HEIGHT: 66 IN | WEIGHT: 181 LBS | HEART RATE: 82 BPM | OXYGEN SATURATION: 97 % | BODY MASS INDEX: 29.09 KG/M2 | SYSTOLIC BLOOD PRESSURE: 149 MMHG

## 2023-07-10 DIAGNOSIS — Z79.899 ENCOUNTER FOR MEDICATION MANAGEMENT: ICD-10-CM

## 2023-07-10 DIAGNOSIS — H81.4 VERTIGO OF CENTRAL ORIGIN, UNSPECIFIED LATERALITY: Primary | ICD-10-CM

## 2023-07-10 DIAGNOSIS — G35 MULTIPLE SCLEROSIS, RELAPSING-REMITTING (HCC): ICD-10-CM

## 2023-07-10 PROCEDURE — 99215 OFFICE O/P EST HI 40 MIN: CPT | Performed by: PSYCHIATRY & NEUROLOGY

## 2023-07-10 RX ORDER — FLUOXETINE 10 MG/1
TABLET, FILM COATED ORAL
COMMUNITY
Start: 2023-06-19

## 2023-07-10 RX ORDER — ALPRAZOLAM 0.5 MG/1
TABLET ORAL
COMMUNITY
Start: 2023-05-22

## 2023-07-10 ASSESSMENT — VISUAL ACUITY: OU: 1

## 2023-07-10 ASSESSMENT — ENCOUNTER SYMPTOMS: RESPIRATORY NEGATIVE: 1

## 2023-07-10 NOTE — PROGRESS NOTES
is not ashen, cyanotic, jaundiced or pale. Nails: There is no clubbing. Neurological:      Mental Status: She is alert and easily aroused. Mental status is at baseline. 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 No dysarthria or facial asymmetry. Sensory: Sensation is intact. Motor: Motor function is intact. No weakness, tremor, atrophy or seizure activity. Coordination: Coordination is intact. Coordination normal.      Gait: Gait is intact. Gait normal.      Comments: Physiologic tremor minimal. PERRLA. No Anisha. FAMILIA +    Psychiatric:         Attention and Perception: Attention normal.         Mood and Affect: Mood is anxious. Mood is not depressed or elated. Affect is not labile, blunt, flat, angry, tearful or inappropriate. Speech: Speech normal.         Behavior: Behavior normal. Behavior is cooperative. Thought Content: Thought content normal.         Cognition and Memory: Cognition normal.        Neurologic Exam     Mental Status   Attention: normal. Concentration: normal.   Speech: speech is normal   Level of consciousness: alert  Knowledge: good. Normal comprehension. Cranial Nerves     CN III, IV, VI   Pupils are equal, round, and reactive to light. Motor Exam   Muscle bulk: normal  Overall muscle tone: normal    Gait, Coordination, and Reflexes     Gait  Gait: normal    Tremor   Resting tremor: absent  Intention tremor: absent  Action tremor: absent  There is no tic, twitch, tonic or clonic activity noted. No dyskinesia. Assessment & Plan     Diagnoses and all orders for this visit:    Vertigo of central origin, unspecified laterality    Multiple sclerosis, relapsing-remitting ([address])    Encounter for medication management    She has had many problems with medications perceived and actual.    Some of the vertigo-vestibular complaints appear possibly migraine.    Discussed with her to have a

## 2023-07-26 DIAGNOSIS — E78.00 PURE HYPERCHOLESTEROLEMIA: ICD-10-CM

## 2023-07-26 LAB
ALBUMIN SERPL-MCNC: 3.9 G/DL (ref 3.5–5)
ALBUMIN/GLOB SERPL: 1.1 (ref 0.4–1.6)
ALP SERPL-CCNC: 65 U/L (ref 50–136)
ALT SERPL-CCNC: 27 U/L (ref 12–65)
ANION GAP SERPL CALC-SCNC: 7 MMOL/L (ref 2–11)
AST SERPL-CCNC: 11 U/L (ref 15–37)
BILIRUB SERPL-MCNC: 0.6 MG/DL (ref 0.2–1.1)
BUN SERPL-MCNC: 11 MG/DL (ref 6–23)
CALCIUM SERPL-MCNC: 9.5 MG/DL (ref 8.3–10.4)
CHLORIDE SERPL-SCNC: 113 MMOL/L (ref 101–110)
CHOLEST SERPL-MCNC: 218 MG/DL
CO2 SERPL-SCNC: 22 MMOL/L (ref 21–32)
CREAT SERPL-MCNC: 0.9 MG/DL (ref 0.6–1)
GLOBULIN SER CALC-MCNC: 3.5 G/DL (ref 2.8–4.5)
GLUCOSE SERPL-MCNC: 88 MG/DL (ref 65–100)
HDLC SERPL-MCNC: 56 MG/DL (ref 40–60)
HDLC SERPL: 3.9
LDLC SERPL CALC-MCNC: 135.8 MG/DL
POTASSIUM SERPL-SCNC: 4.2 MMOL/L (ref 3.5–5.1)
PROT SERPL-MCNC: 7.4 G/DL (ref 6.3–8.2)
SODIUM SERPL-SCNC: 142 MMOL/L (ref 133–143)
TRIGL SERPL-MCNC: 131 MG/DL (ref 35–150)
VLDLC SERPL CALC-MCNC: 26.2 MG/DL (ref 6–23)

## 2023-07-27 ENCOUNTER — OFFICE VISIT (OUTPATIENT)
Dept: INTERNAL MEDICINE CLINIC | Facility: CLINIC | Age: 58
End: 2023-07-27
Payer: COMMERCIAL

## 2023-07-27 VITALS
WEIGHT: 179.8 LBS | HEIGHT: 66 IN | DIASTOLIC BLOOD PRESSURE: 62 MMHG | BODY MASS INDEX: 28.9 KG/M2 | SYSTOLIC BLOOD PRESSURE: 120 MMHG | HEART RATE: 76 BPM | OXYGEN SATURATION: 95 %

## 2023-07-27 DIAGNOSIS — E55.9 VITAMIN D DEFICIENCY: Chronic | ICD-10-CM

## 2023-07-27 DIAGNOSIS — E78.00 PURE HYPERCHOLESTEROLEMIA: Primary | Chronic | ICD-10-CM

## 2023-07-27 DIAGNOSIS — R93.1 ELEVATED CORONARY ARTERY CALCIUM SCORE: Chronic | ICD-10-CM

## 2023-07-27 DIAGNOSIS — K21.9 GASTROESOPHAGEAL REFLUX DISEASE, UNSPECIFIED WHETHER ESOPHAGITIS PRESENT: ICD-10-CM

## 2023-07-27 DIAGNOSIS — F41.9 ANXIETY: ICD-10-CM

## 2023-07-27 PROCEDURE — 99215 OFFICE O/P EST HI 40 MIN: CPT | Performed by: NURSE PRACTITIONER

## 2023-07-27 RX ORDER — PANTOPRAZOLE SODIUM 40 MG/1
40 TABLET, DELAYED RELEASE ORAL DAILY
Qty: 90 TABLET | Refills: 3 | Status: SHIPPED | OUTPATIENT
Start: 2023-07-27

## 2023-07-27 RX ORDER — PANTOPRAZOLE SODIUM 40 MG/1
40 TABLET, DELAYED RELEASE ORAL DAILY
COMMUNITY
Start: 2023-05-22 | End: 2023-07-27 | Stop reason: SDUPTHER

## 2023-07-27 RX ORDER — ATORVASTATIN CALCIUM 10 MG/1
10 TABLET, FILM COATED ORAL EVERY EVENING
Qty: 90 TABLET | Refills: 3 | Status: SHIPPED | OUTPATIENT
Start: 2023-07-27

## 2023-07-27 SDOH — ECONOMIC STABILITY: HOUSING INSECURITY
IN THE LAST 12 MONTHS, WAS THERE A TIME WHEN YOU DID NOT HAVE A STEADY PLACE TO SLEEP OR SLEPT IN A SHELTER (INCLUDING NOW)?: PATIENT REFUSED

## 2023-07-27 SDOH — ECONOMIC STABILITY: FOOD INSECURITY: WITHIN THE PAST 12 MONTHS, THE FOOD YOU BOUGHT JUST DIDN'T LAST AND YOU DIDN'T HAVE MONEY TO GET MORE.: PATIENT DECLINED

## 2023-07-27 SDOH — ECONOMIC STABILITY: FOOD INSECURITY: WITHIN THE PAST 12 MONTHS, YOU WORRIED THAT YOUR FOOD WOULD RUN OUT BEFORE YOU GOT MONEY TO BUY MORE.: PATIENT DECLINED

## 2023-07-27 SDOH — ECONOMIC STABILITY: INCOME INSECURITY: HOW HARD IS IT FOR YOU TO PAY FOR THE VERY BASICS LIKE FOOD, HOUSING, MEDICAL CARE, AND HEATING?: PATIENT DECLINED

## 2023-07-27 ASSESSMENT — PATIENT HEALTH QUESTIONNAIRE - PHQ9
1. LITTLE INTEREST OR PLEASURE IN DOING THINGS: 0
2. FEELING DOWN, DEPRESSED OR HOPELESS: 0
SUM OF ALL RESPONSES TO PHQ QUESTIONS 1-9: 0
SUM OF ALL RESPONSES TO PHQ QUESTIONS 1-9: 0
SUM OF ALL RESPONSES TO PHQ9 QUESTIONS 1 & 2: 0
SUM OF ALL RESPONSES TO PHQ QUESTIONS 1-9: 0
SUM OF ALL RESPONSES TO PHQ QUESTIONS 1-9: 0

## 2023-07-27 ASSESSMENT — ENCOUNTER SYMPTOMS
SHORTNESS OF BREATH: 0
COUGH: 0
COLOR CHANGE: 0
ABDOMINAL PAIN: 0
CHEST TIGHTNESS: 0
WHEEZING: 0

## 2023-07-27 NOTE — PATIENT INSTRUCTIONS
Recent Results (from the past 672 hour(s))   Lipid Panel    Collection Time: 07/26/23  8:34 AM   Result Value Ref Range    Cholesterol, Total 218 (H) <200 MG/DL    Triglycerides 131 35 - 150 MG/DL    HDL 56 40 - 60 MG/DL    LDL Calculated 135.8 (H) <100 MG/DL    VLDL Cholesterol Calculated 26.2 (H) 6.0 - 23.0 MG/DL    Chol/HDL Ratio 3.9     Comprehensive Metabolic Panel    Collection Time: 07/26/23  8:34 AM   Result Value Ref Range    Sodium 142 133 - 143 mmol/L    Potassium 4.2 3.5 - 5.1 mmol/L    Chloride 113 (H) 101 - 110 mmol/L    CO2 22 21 - 32 mmol/L    Anion Gap 7 2 - 11 mmol/L    Glucose 88 65 - 100 mg/dL    BUN 11 6 - 23 MG/DL    Creatinine 0.90 0.6 - 1.0 MG/DL    Est, Glom Filt Rate >60 >60 ml/min/1.73m2    Calcium 9.5 8.3 - 10.4 MG/DL    Total Bilirubin 0.6 0.2 - 1.1 MG/DL    ALT 27 12 - 65 U/L    AST 11 (L) 15 - 37 U/L    Alk Phosphatase 65 50 - 136 U/L    Total Protein 7.4 6.3 - 8.2 g/dL    Albumin 3.9 3.5 - 5.0 g/dL    Globulin 3.5 2.8 - 4.5 g/dL    Albumin/Globulin Ratio 1.1 0.4 - 1.6         Restart atorvastatin 10 mg one tablet every evening

## 2023-07-27 NOTE — PROGRESS NOTES
KIRSTYD, with labs. current treatment plan is effective, no change in therapy  lab results and schedule of future lab studies reviewed with patient  cardiovascular risk and specific lipid/LDL goals reviewed  reviewed medications and side effects in detail  Today she is quite anxious. She believes it is related to having elevated blood pressures at home as well as the fact that her  has had a crash and he has \"started acting weird. \"  She states he is paranoid when a car goes by their house and slows down. She reports this as \"stressing her out\" because he has never been like this before. She has had a few elevated blood pressures at home reporting systolic blood pressures of 150s to 160s. On medication review she states she is not taking the atenolol. However recall of the names of the medications she is unsure of. She is advised to bring all medications to each office visit. She reports taking the atorvastatin 10 mg every evening but dispense report does not show any later dates then 1/22/2023 for dispensing atorvastatin. LDL cholesterol is still 135 previous measurements were 143 and 147. She is encouraged to restart her atorvastatin 10 mg each evening. Recommend remeasurement of vitamin D level. She believes her reflux is fairly stable. On this date 07/27/23 I have spent 44 minutes reviewing previous notes, test results and face to face with the patient. Over 50% of today's office visit was spent in face to face time in counseling reviewing test results, importance of compliance, education about disease process, benefits and side-effects of medications and follow-up plan. CHRISTOPHER Galeana NP    Dictated using voice recognition software.  Proofread, but unrecognized voice recognition errors may exist.

## 2023-08-06 PROBLEM — F41.9 ANXIETY: Status: ACTIVE | Noted: 2021-09-07

## 2023-08-16 ENCOUNTER — OFFICE VISIT (OUTPATIENT)
Dept: INTERNAL MEDICINE CLINIC | Facility: CLINIC | Age: 58
End: 2023-08-16
Payer: COMMERCIAL

## 2023-08-16 VITALS
HEART RATE: 80 BPM | SYSTOLIC BLOOD PRESSURE: 124 MMHG | WEIGHT: 182 LBS | OXYGEN SATURATION: 98 % | BODY MASS INDEX: 29.38 KG/M2 | DIASTOLIC BLOOD PRESSURE: 70 MMHG

## 2023-08-16 DIAGNOSIS — R42 VERTIGO: Primary | ICD-10-CM

## 2023-08-16 DIAGNOSIS — H69.83 DYSFUNCTION OF BOTH EUSTACHIAN TUBES: ICD-10-CM

## 2023-08-16 DIAGNOSIS — J30.1 ALLERGIC RHINITIS DUE TO POLLEN, UNSPECIFIED SEASONALITY: ICD-10-CM

## 2023-08-16 DIAGNOSIS — G35 MULTIPLE SCLEROSIS, RELAPSING-REMITTING (HCC): ICD-10-CM

## 2023-08-16 PROCEDURE — 99213 OFFICE O/P EST LOW 20 MIN: CPT | Performed by: NURSE PRACTITIONER

## 2023-08-16 RX ORDER — PREDNISONE 5 MG/1
TABLET ORAL
Qty: 1 EACH | Refills: 0 | Status: SHIPPED | OUTPATIENT
Start: 2023-08-16

## 2023-08-16 RX ORDER — MECLIZINE HYDROCHLORIDE 25 MG/1
25 TABLET ORAL
Qty: 10 TABLET | Refills: 0 | Status: SHIPPED | OUTPATIENT
Start: 2023-08-16 | End: 2023-08-26

## 2023-08-16 ASSESSMENT — PATIENT HEALTH QUESTIONNAIRE - PHQ9
SUM OF ALL RESPONSES TO PHQ QUESTIONS 1-9: 0
SUM OF ALL RESPONSES TO PHQ9 QUESTIONS 1 & 2: 0
2. FEELING DOWN, DEPRESSED OR HOPELESS: 0
1. LITTLE INTEREST OR PLEASURE IN DOING THINGS: 0
SUM OF ALL RESPONSES TO PHQ QUESTIONS 1-9: 0

## 2023-08-16 ASSESSMENT — ENCOUNTER SYMPTOMS
SINUS PRESSURE: 1
COUGH: 0
SHORTNESS OF BREATH: 0

## 2023-08-16 NOTE — PROGRESS NOTES
PROGRESS NOTE      Chief Complaint   Patient presents with    Ear Fullness     Bilateral ear pain. Patient states when she feels over, she can fill the water pressure in ears (mainly left.)       HPI    Ear fulness: Bilateral ear fulness, popping and pressure for few days. Mild vertiginous sensation. Sneezing, postnasal drainage. Off of loratadine for few weeks (takes for allergic rhinitis). MS: Followed by Dr Royal Boo. Evaluated last month. Vertigo with abnormal EOM. Considered IV Solumedrol but not scheduled. Intolerant of multiple medications in past (reports intolerance to Tecfedera, interferon). Past Medical History, Past Surgical History, Family history, Social History, and Medications were all reviewed and updated as necessary. Current Outpatient Medications   Medication Sig Dispense Refill    predniSONE 5 MG (21) TBPK As directed 1 each 0    meclizine (ANTIVERT) 25 MG tablet Take 1 tablet by mouth nightly as needed for Dizziness 10 tablet 0    pantoprazole (PROTONIX) 40 MG tablet Take 1 tablet by mouth daily Indications: Gastroesophageal Reflux Disease 90 tablet 3    ALPRAZolam (XANAX) 0.5 MG tablet TAKE 1 TABLET EVERY DAY AS NEEDED      ergocalciferol (ERGOCALCIFEROL) 1.25 MG (39114 UT) capsule Take 1 capsule by mouth Twice a Week 24 capsule 1    cyanocobalamin 1000 MCG/ML injection Inject 1 mL into the muscle every 30 days 3 mL 3    atorvastatin (LIPITOR) 10 MG tablet Take 1 tablet by mouth every evening Indications: High Amount of Fats in the Blood (Patient not taking: Reported on 8/16/2023) 90 tablet 3    EPINEPHrine (EPIPEN) 0.3 MG/0.3ML SOAJ injection USE AS DIRECTED AS NEEDED (Patient not taking: Reported on 8/16/2023)      loratadine (CLARITIN) 10 MG tablet TAKE 1 TABLET BY MOUTH EVERY DAY (Patient not taking: Reported on 8/16/2023)       No current facility-administered medications for this visit.      Allergies   Allergen Reactions    Latex Itching    Aspirin Swelling and Angioedema

## 2023-10-27 DIAGNOSIS — R93.1 ELEVATED CORONARY ARTERY CALCIUM SCORE: Chronic | ICD-10-CM

## 2023-10-27 DIAGNOSIS — E55.9 VITAMIN D DEFICIENCY: Chronic | ICD-10-CM

## 2023-10-27 DIAGNOSIS — E78.00 PURE HYPERCHOLESTEROLEMIA: Chronic | ICD-10-CM

## 2023-10-27 LAB
25(OH)D3 SERPL-MCNC: 105.3 NG/ML (ref 30–100)
ALBUMIN SERPL-MCNC: 3.8 G/DL (ref 3.5–5)
ALBUMIN/GLOB SERPL: 1.1 (ref 0.4–1.6)
ALP SERPL-CCNC: 57 U/L (ref 50–136)
ALT SERPL-CCNC: 25 U/L (ref 12–65)
ANION GAP SERPL CALC-SCNC: 9 MMOL/L (ref 2–11)
AST SERPL-CCNC: 14 U/L (ref 15–37)
BILIRUB SERPL-MCNC: 0.5 MG/DL (ref 0.2–1.1)
BUN SERPL-MCNC: 7 MG/DL (ref 6–23)
CALCIUM SERPL-MCNC: 9.5 MG/DL (ref 8.3–10.4)
CHLORIDE SERPL-SCNC: 111 MMOL/L (ref 101–110)
CHOLEST SERPL-MCNC: 141 MG/DL
CO2 SERPL-SCNC: 22 MMOL/L (ref 21–32)
CREAT SERPL-MCNC: 0.8 MG/DL (ref 0.6–1)
GLOBULIN SER CALC-MCNC: 3.4 G/DL (ref 2.8–4.5)
GLUCOSE SERPL-MCNC: 101 MG/DL (ref 65–100)
HDLC SERPL-MCNC: 60 MG/DL (ref 40–60)
HDLC SERPL: 2.4
LDLC SERPL CALC-MCNC: 63 MG/DL
POTASSIUM SERPL-SCNC: 3.9 MMOL/L (ref 3.5–5.1)
PROT SERPL-MCNC: 7.2 G/DL (ref 6.3–8.2)
SODIUM SERPL-SCNC: 142 MMOL/L (ref 133–143)
TRIGL SERPL-MCNC: 90 MG/DL (ref 35–150)
VLDLC SERPL CALC-MCNC: 18 MG/DL (ref 6–23)

## 2023-11-10 ENCOUNTER — TELEPHONE (OUTPATIENT)
Dept: INTERNAL MEDICINE CLINIC | Facility: CLINIC | Age: 58
End: 2023-11-10

## 2023-11-17 DIAGNOSIS — E53.8 VITAMIN B12 DEFICIENCY: ICD-10-CM

## 2023-11-17 RX ORDER — CYANOCOBALAMIN 1000 UG/ML
1000 INJECTION, SOLUTION INTRAMUSCULAR; SUBCUTANEOUS
Qty: 3 ML | Refills: 3 | OUTPATIENT
Start: 2023-11-17

## 2023-12-28 ENCOUNTER — TELEPHONE (OUTPATIENT)
Dept: PRIMARY CARE CLINIC | Facility: CLINIC | Age: 58
End: 2023-12-28

## 2023-12-28 NOTE — TELEPHONE ENCOUNTER
----- Message from [facility] SYED Zepeda sent at 12/28/2023  2:24 PM EST -----  Subject: Message to Provider    QUESTIONS  Information for Provider? The pt called to update the office that she   tested negative for Covid at this time and will be at her appt on 12/29 at   8am and will do so wearing a mask. Please advise as we were unable to   reach the clinical staff after several attempts.  ---------------------------------------------------------------------------  --------------  Abilio White  [phone]; OK to leave message on voicemail  ---------------------------------------------------------------------------  --------------  SCRIPT ANSWERS  Relationship to Patient?  Self

## 2023-12-29 ENCOUNTER — OFFICE VISIT (OUTPATIENT)
Dept: INTERNAL MEDICINE CLINIC | Facility: CLINIC | Age: 58
End: 2023-12-29
Payer: COMMERCIAL

## 2023-12-29 VITALS
DIASTOLIC BLOOD PRESSURE: 62 MMHG | WEIGHT: 179 LBS | SYSTOLIC BLOOD PRESSURE: 124 MMHG | OXYGEN SATURATION: 96 % | HEIGHT: 66 IN | BODY MASS INDEX: 28.77 KG/M2 | HEART RATE: 77 BPM

## 2023-12-29 DIAGNOSIS — E53.8 VITAMIN B12 DEFICIENCY: ICD-10-CM

## 2023-12-29 DIAGNOSIS — J01.10 ACUTE NON-RECURRENT FRONTAL SINUSITIS: ICD-10-CM

## 2023-12-29 DIAGNOSIS — E78.00 PURE HYPERCHOLESTEROLEMIA: Primary | Chronic | ICD-10-CM

## 2023-12-29 DIAGNOSIS — R93.1 ELEVATED CORONARY ARTERY CALCIUM SCORE: Chronic | ICD-10-CM

## 2023-12-29 DIAGNOSIS — J30.1 ALLERGIC RHINITIS DUE TO POLLEN, UNSPECIFIED SEASONALITY: ICD-10-CM

## 2023-12-29 DIAGNOSIS — E55.9 VITAMIN D DEFICIENCY: Chronic | ICD-10-CM

## 2023-12-29 DIAGNOSIS — R73.01 ELEVATED FASTING GLUCOSE: ICD-10-CM

## 2023-12-29 PROBLEM — J30.81 ALLERGIC RHINITIS DUE TO ANIMAL HAIR AND DANDER: Status: ACTIVE | Noted: 2023-12-29

## 2023-12-29 PROBLEM — Z79.890 HORMONE REPLACEMENT THERAPY (POSTMENOPAUSAL): Status: RESOLVED | Noted: 2017-11-15 | Resolved: 2023-12-29

## 2023-12-29 PROCEDURE — 99215 OFFICE O/P EST HI 40 MIN: CPT | Performed by: NURSE PRACTITIONER

## 2023-12-29 RX ORDER — ERGOCALCIFEROL 1.25 MG/1
50000 CAPSULE ORAL
Qty: 8 CAPSULE | Refills: 3 | Status: SHIPPED | OUTPATIENT
Start: 2023-12-29

## 2023-12-29 RX ORDER — AZELASTINE 1 MG/ML
2 SPRAY, METERED NASAL 2 TIMES DAILY
Qty: 120 ML | Refills: 1 | Status: SHIPPED | OUTPATIENT
Start: 2023-12-29

## 2023-12-29 RX ORDER — CYANOCOBALAMIN 1000 UG/ML
1000 INJECTION, SOLUTION INTRAMUSCULAR; SUBCUTANEOUS
Qty: 3 ML | Refills: 3 | Status: SHIPPED | OUTPATIENT
Start: 2023-12-29

## 2023-12-29 RX ORDER — AMOXICILLIN 500 MG/1
500 CAPSULE ORAL 3 TIMES DAILY
Qty: 30 CAPSULE | Refills: 0 | Status: SHIPPED | OUTPATIENT
Start: 2023-12-29 | End: 2024-01-08

## 2023-12-29 ASSESSMENT — PATIENT HEALTH QUESTIONNAIRE - PHQ9
SUM OF ALL RESPONSES TO PHQ QUESTIONS 1-9: 0
1. LITTLE INTEREST OR PLEASURE IN DOING THINGS: 0
SUM OF ALL RESPONSES TO PHQ QUESTIONS 1-9: 0
2. FEELING DOWN, DEPRESSED OR HOPELESS: 0
SUM OF ALL RESPONSES TO PHQ QUESTIONS 1-9: 0
SUM OF ALL RESPONSES TO PHQ QUESTIONS 1-9: 0
SUM OF ALL RESPONSES TO PHQ9 QUESTIONS 1 & 2: 0

## 2023-12-29 NOTE — PATIENT INSTRUCTIONS
Use Claritin 10 mg daily.    Use Afrin 2 sprays to each nostril twice a day for 3 days maximum.  Wait 5 to 10 minutes between first and second sprays.    Use sterile nasal saline, such as \"Simply Saline,\" to irrigate nasal passages and moisturize mucosal membranes.  Gently blow nose after using sterile nasal saline.    Astelin nasal spray 1 to 2 sprays to each nostril one to two times per day.

## 2023-12-29 NOTE — PROGRESS NOTES
PROGRESS NOTE    SUBJECTIVE:   Helena Beckett is a 58 y.o. female seen for a follow up visit for   Chief Complaint   Patient presents with    Nasal Congestion     COVID test at home yesterday negative    Anxiety    Cholesterol Problem       HPI    Nasal congestion -- approximately 2 to 3 days ago.    CHRONIC MEDICAL PROBLEMS  HLD -- with mild elevation of the CCS 29 in LAD.    Palpitations -- wake up at night time with heart racing and/or pounding.    Tremor -- Reports tremor is only random.  Dr. Santana started gabapentin for tremors but she did not start the gabapentin.      Hiatal hernia -- GERD also.  Reports allergy to foods such as garlic.    Anxiety -- when at neuologist office anxious related hallway tile that worsened balance.    Elevated blood pressure and anxiety  Has been taking blood pressure every evening.  BP 140s to 150s systolic.  States she took 1/2 of a xanax last night.     Reviewed and updated this visit by provider:  Tobacco  Allergies  Meds  Problems  Med Hx  Surg Hx  Fam Hx         Review of Systems   HENT:  Positive for congestion (nasal congestion), rhinorrhea, sinus pain (\"teeth hurting\") and sneezing (frequently).    Respiratory:  Negative for cough, chest tightness, shortness of breath and wheezing.    Cardiovascular:  Negative for chest pain, palpitations and leg swelling.   Gastrointestinal:  Negative for abdominal pain.   Musculoskeletal:  Negative for arthralgias.   Skin:  Negative for color change.   Allergic/Immunologic: Negative for environmental allergies.   Neurological:  Negative for dizziness and headaches.   Hematological:  Does not bruise/bleed easily.   Psychiatric/Behavioral:  The patient is nervous/anxious.         OBJECTIVE:    /62   Pulse 77   Ht 1.676 m (5' 6\")   Wt 81.2 kg (179 lb)   SpO2 96%   BMI 28.89 kg/m²      Physical Exam  Vitals and nursing note reviewed.   Constitutional:       Appearance: Normal appearance. She is well-groomed.   HENT:

## 2024-01-22 ENCOUNTER — OFFICE VISIT (OUTPATIENT)
Dept: NEUROLOGY | Age: 59
End: 2024-01-22
Payer: COMMERCIAL

## 2024-01-22 VITALS
BODY MASS INDEX: 28.08 KG/M2 | SYSTOLIC BLOOD PRESSURE: 149 MMHG | OXYGEN SATURATION: 98 % | HEART RATE: 73 BPM | WEIGHT: 174 LBS | DIASTOLIC BLOOD PRESSURE: 83 MMHG

## 2024-01-22 DIAGNOSIS — G35 MULTIPLE SCLEROSIS, RELAPSING-REMITTING (HCC): Primary | Chronic | ICD-10-CM

## 2024-01-22 DIAGNOSIS — R25.1 TREMOR: ICD-10-CM

## 2024-01-22 DIAGNOSIS — Z79.899 ENCOUNTER FOR MEDICATION MANAGEMENT: ICD-10-CM

## 2024-01-22 PROBLEM — E53.8 VITAMIN B12 DEFICIENCY: Chronic | Status: RESOLVED | Noted: 2022-12-02 | Resolved: 2024-01-22

## 2024-01-22 PROBLEM — E55.9 VITAMIN D DEFICIENCY: Chronic | Status: RESOLVED | Noted: 2017-11-15 | Resolved: 2024-01-22

## 2024-01-22 PROCEDURE — 99215 OFFICE O/P EST HI 40 MIN: CPT | Performed by: PSYCHIATRY & NEUROLOGY

## 2024-01-22 RX ORDER — GABAPENTIN 100 MG/1
100 CAPSULE ORAL 3 TIMES DAILY
Qty: 90 CAPSULE | Refills: 5 | Status: SHIPPED | OUTPATIENT
Start: 2024-01-22 | End: 2024-07-20

## 2024-01-22 ASSESSMENT — VISUAL ACUITY: OU: 1

## 2024-01-22 ASSESSMENT — ENCOUNTER SYMPTOMS: BACK PAIN: 0

## 2024-01-22 NOTE — PROGRESS NOTES
Neurodiagnostics   [facility] [place] [address]  Phone:  [phone]  Fax:   [phone]        Signed By: Phong Wiggins MD     January 22, 2024

## 2024-02-27 ENCOUNTER — PATIENT MESSAGE (OUTPATIENT)
Dept: NEUROLOGY | Age: 59
End: 2024-02-27

## 2024-02-27 DIAGNOSIS — G35 MULTIPLE SCLEROSIS, RELAPSING-REMITTING (HCC): Primary | ICD-10-CM

## 2024-02-27 NOTE — TELEPHONE ENCOUNTER
Patient requesting updated MRI orders for annual MRIs with history of MS.  I will see patient for the first time in January 2025.  She reports she will run out of her insurance this summer and would like to have these done prior to losing coverage.  Orders placed

## 2024-02-29 ENCOUNTER — OFFICE VISIT (OUTPATIENT)
Dept: INTERNAL MEDICINE CLINIC | Facility: CLINIC | Age: 59
End: 2024-02-29
Payer: COMMERCIAL

## 2024-02-29 VITALS
HEART RATE: 69 BPM | BODY MASS INDEX: 29.41 KG/M2 | SYSTOLIC BLOOD PRESSURE: 130 MMHG | DIASTOLIC BLOOD PRESSURE: 70 MMHG | HEIGHT: 66 IN | OXYGEN SATURATION: 97 % | WEIGHT: 183 LBS

## 2024-02-29 DIAGNOSIS — F41.9 ANXIETY: ICD-10-CM

## 2024-02-29 DIAGNOSIS — R25.1 TREMOR OF LEFT HAND: Primary | ICD-10-CM

## 2024-02-29 PROCEDURE — 99213 OFFICE O/P EST LOW 20 MIN: CPT | Performed by: NURSE PRACTITIONER

## 2024-02-29 ASSESSMENT — PATIENT HEALTH QUESTIONNAIRE - PHQ9
SUM OF ALL RESPONSES TO PHQ QUESTIONS 1-9: 2
SUM OF ALL RESPONSES TO PHQ QUESTIONS 1-9: 2
2. FEELING DOWN, DEPRESSED OR HOPELESS: 1
1. LITTLE INTEREST OR PLEASURE IN DOING THINGS: 1
SUM OF ALL RESPONSES TO PHQ QUESTIONS 1-9: 2
SUM OF ALL RESPONSES TO PHQ9 QUESTIONS 1 & 2: 2
1. LITTLE INTEREST OR PLEASURE IN DOING THINGS: SEVERAL DAYS
2. FEELING DOWN, DEPRESSED OR HOPELESS: SEVERAL DAYS
SUM OF ALL RESPONSES TO PHQ9 QUESTIONS 1 & 2: 2
SUM OF ALL RESPONSES TO PHQ QUESTIONS 1-9: 2

## 2024-02-29 NOTE — PROGRESS NOTES
PROGRESS NOTE    SUBJECTIVE:   Helena Beckett is a 59 y.o. female seen for a follow up visit for   Chief Complaint   Patient presents with    OTHER     Feeling jittery inside S/p wrist injections most recent was Tuesday.      HPI    Had wrist pain right, had a MRI of the right wrist.  Had steroid injection Tuesday right had x 2 and x2 injection on the .    Tremors -- not in left hand since steroid injection.      Reports she is concerned because hand tremor is associated with her MS.  She is concerned about relapse.        Past Medical History:   Diagnosis Date    Anxiety     Autoimmune disease (HCC)     MS    Endometriosis     Environmental and seasonal allergies     Family history of colonic polyps     mother    Fever blister     GERD (gastroesophageal reflux disease)     controlled with medication     H/O echocardiogram 2017    EF 55-60%    History of palpitations     r/t medication     Hormone replacement therapy (postmenopausal) 11/15/2017    Internal hemorrhoids without mention of complication     Multiple sclerosis ([facility])     Followed by Dr. Mijares, no medication at this time (19)    Ovarian cyst     Personal history of colonic polyps     TVA, TA    Rectocele     S/P cataract extraction and insertion of intraocular lens 3/3/2016    Seizures ([facility])         Past Surgical History:   Procedure Laterality Date    CATARACT REMOVAL Bilateral     with iol     SECTION      COLONOSCOPY  2014, 2017 -[facility]--1.5 cm sigmoid TVA, 5 small asc TAs--3 year recall;  -- mixed TVA    DILATION AND CURETTAGE OF UTERUS  2019    ENDOMETRIAL BIOPSY       benign    PELVIC LAPAROSCOPY      laproscopic for endometriosis x14       Current Outpatient Medications   Medication Sig Dispense Refill    hydrocortisone 2.5 % cream APPLY TO AFFECTED AREA 3 TIMES A DAY      cyanocobalamin 1000 MCG/ML injection Inject 1 mL into the muscle every 30 days 3 mL 3    ergocalciferol

## 2024-03-12 ASSESSMENT — ENCOUNTER SYMPTOMS
SHORTNESS OF BREATH: 0
CHEST TIGHTNESS: 0

## 2024-03-21 ENCOUNTER — TELEPHONE (OUTPATIENT)
Dept: NEUROLOGY | Age: 59
End: 2024-03-21

## 2024-03-22 ENCOUNTER — HOSPITAL ENCOUNTER (OUTPATIENT)
Dept: MRI IMAGING | Age: 59
Discharge: HOME | End: 2024-03-25

## 2024-03-22 DIAGNOSIS — G35 MULTIPLE SCLEROSIS, RELAPSING-REMITTING (HCC): ICD-10-CM

## 2024-04-26 DIAGNOSIS — R73.01 ELEVATED FASTING GLUCOSE: ICD-10-CM

## 2024-04-26 DIAGNOSIS — E78.00 PURE HYPERCHOLESTEROLEMIA: Chronic | ICD-10-CM

## 2024-04-26 DIAGNOSIS — R93.1 ELEVATED CORONARY ARTERY CALCIUM SCORE: Chronic | ICD-10-CM

## 2024-04-26 DIAGNOSIS — E55.9 VITAMIN D DEFICIENCY: Chronic | ICD-10-CM

## 2024-04-26 LAB
25(OH)D3 SERPL-MCNC: 59.6 NG/ML (ref 30–100)
ALBUMIN SERPL-MCNC: 4.2 G/DL (ref 3.2–4.6)
ALBUMIN/GLOB SERPL: 1.5 (ref 1–1.9)
ALP SERPL-CCNC: 66 U/L (ref 35–104)
ALT SERPL-CCNC: 20 U/L (ref 12–65)
ANION GAP SERPL CALC-SCNC: 12 MMOL/L (ref 9–18)
AST SERPL-CCNC: 22 U/L (ref 15–37)
BILIRUB SERPL-MCNC: 0.7 MG/DL (ref 0–1.2)
BUN SERPL-MCNC: 12 MG/DL (ref 8–23)
CALCIUM SERPL-MCNC: 9.8 MG/DL (ref 8.8–10.2)
CHLORIDE SERPL-SCNC: 106 MMOL/L (ref 98–107)
CHOLEST SERPL-MCNC: 154 MG/DL (ref 0–200)
CO2 SERPL-SCNC: 25 MMOL/L (ref 20–28)
CREAT SERPL-MCNC: 0.81 MG/DL (ref 0.6–1.1)
EST. AVERAGE GLUCOSE BLD GHB EST-MCNC: 102 MG/DL
GLOBULIN SER CALC-MCNC: 2.8 G/DL (ref 2.3–3.5)
GLUCOSE SERPL-MCNC: 82 MG/DL (ref 70–99)
HBA1C MFR BLD: 5.2 % (ref 0–5.6)
HDLC SERPL-MCNC: 58 MG/DL (ref 40–60)
HDLC SERPL: 2.7 (ref 0–5)
LDLC SERPL CALC-MCNC: 81 MG/DL (ref 0–100)
POTASSIUM SERPL-SCNC: 4.1 MMOL/L (ref 3.5–5.1)
PROT SERPL-MCNC: 7 G/DL (ref 6.3–8.2)
SODIUM SERPL-SCNC: 142 MMOL/L (ref 136–145)
TRIGL SERPL-MCNC: 72 MG/DL (ref 0–150)
VLDLC SERPL CALC-MCNC: 14 MG/DL (ref 6–23)

## 2024-05-06 ENCOUNTER — OFFICE VISIT (OUTPATIENT)
Dept: INTERNAL MEDICINE CLINIC | Facility: CLINIC | Age: 59
End: 2024-05-06
Payer: COMMERCIAL

## 2024-05-06 VITALS
OXYGEN SATURATION: 98 % | HEIGHT: 66 IN | BODY MASS INDEX: 29.15 KG/M2 | SYSTOLIC BLOOD PRESSURE: 142 MMHG | HEART RATE: 67 BPM | WEIGHT: 181.4 LBS | DIASTOLIC BLOOD PRESSURE: 92 MMHG

## 2024-05-06 DIAGNOSIS — R93.1 ELEVATED CORONARY ARTERY CALCIUM SCORE: Chronic | ICD-10-CM

## 2024-05-06 DIAGNOSIS — E78.00 PURE HYPERCHOLESTEROLEMIA: Primary | Chronic | ICD-10-CM

## 2024-05-06 DIAGNOSIS — K21.9 GASTROESOPHAGEAL REFLUX DISEASE, UNSPECIFIED WHETHER ESOPHAGITIS PRESENT: ICD-10-CM

## 2024-05-06 PROCEDURE — 99214 OFFICE O/P EST MOD 30 MIN: CPT | Performed by: NURSE PRACTITIONER

## 2024-05-06 RX ORDER — PANTOPRAZOLE SODIUM 40 MG/1
40 TABLET, DELAYED RELEASE ORAL DAILY
Qty: 100 TABLET | Refills: 3 | Status: SHIPPED | OUTPATIENT
Start: 2024-05-06

## 2024-05-06 RX ORDER — ATORVASTATIN CALCIUM 10 MG/1
10 TABLET, FILM COATED ORAL EVERY EVENING
Qty: 100 TABLET | Refills: 3 | Status: SHIPPED | OUTPATIENT
Start: 2024-05-06

## 2024-05-06 NOTE — PROGRESS NOTES
PROGRESS NOTE    SUBJECTIVE:   Helena Beckett is a 59 y.o. female seen for a follow up visit for   Chief Complaint   Patient presents with   • Hyperlipidemia     4 mo f/u with lab review     HPI    CHRONIC MEDICAL PROBLEMS  HLD -- with mild elevation of the CCS 29 in LAD.    Palpitations -- wake up at night time with heart racing and/or pounding.    Tremor -- Reports tremor is only random.  Dr. Santana started gabapentin for tremors but she did not start the gabapentin.      Hiatal hernia -- GERD also.  Reports allergy to foods such as garlic.    Anxiety -- when at neuologist office anxious related hallway tile that worsened balance.    Elevated blood pressure and anxiety  Has been taking blood pressure every evening.  BP 140s to 150s systolic.  States she took 1/2 of a xanax last night.      Past Medical History:   Diagnosis Date   • Anxiety    • Autoimmune disease (HCC)     MS   • Endometriosis    • Environmental and seasonal allergies    • Family history of colonic polyps     mother   • Fever blister    • GERD (gastroesophageal reflux disease)     controlled with medication    • H/O echocardiogram 2017    EF 55-60%   • History of palpitations     r/t medication    • Hormone replacement therapy (postmenopausal) 11/15/2017   • Internal hemorrhoids without mention of complication    • Multiple sclerosis (HCC)     Followed by Dr. Mijares, no medication at this time (19)   • Ovarian cyst    • Personal history of colonic polyps     TVA, TA   • Rectocele    • S/P cataract extraction and insertion of intraocular lens 3/3/2016   • Seizures (HCC)         Past Surgical History:   Procedure Laterality Date   • CATARACT REMOVAL Bilateral     with iol   •  SECTION     • COLONOSCOPY  2014, 2017 -[facility]--1.5 cm sigmoid TVA, 5 small asc TAs--3 year recall;  -- mixed TVA   • DILATION AND CURETTAGE OF UTERUS  2019   • ENDOMETRIAL BIOPSY       benign   • PELVIC LAPAROSCOPY

## 2024-06-12 NOTE — PROGRESS NOTES
REBECCA Lopez is a 61 y.o. female seen for follow-up endometrial thickening secondary to unopposed estrogen. She has stopped her hormones. She had an ultrasound today. Past Medical History, Past Surgical History, Family history, Social History, and Medications were all reviewed with the patient today and updated as necessary. Current Outpatient Medications   Medication Sig    ALPRAZolam (XANAX) 0.5 MG tablet Take 2 tablets by mouth once as needed for Anxiety (MRI) for up to 1 dose. omeprazole-sodium bicarbonate (ZEGERID)  MG per capsule Take 1 capsule by mouth    cyanocobalamin 1000 MCG/ML injection Inject 1,000 mcg into the muscle    EPINEPHrine (EPIPEN) 0.3 MG/0.3ML SOAJ injection USE AS DIRECTED AS NEEDED    ergocalciferol (ERGOCALCIFEROL) 1.25 MG (13433 UT) capsule Take 50,000 Units by mouth Twice a Week    loratadine (CLARITIN) 10 MG tablet TAKE 1 TABLET BY MOUTH EVERY DAY    atorvastatin (LIPITOR) 10 MG tablet Take 10 mg by mouth daily ON HOLD (Patient not taking: No sig reported)    gabapentin (NEURONTIN) 100 MG capsule Take 1 capsule by mouth 4 times daily for 180 days. Intended supply: 30 days (Patient not taking: No sig reported)     No current facility-administered medications for this visit. Allergies   Allergen Reactions    Latex Itching    Aspirin Swelling and Angioedema    Casein Hives    Dimethyl Fumarate Other (See Comments)     Hip pain severe dissipated on d/c Tecfidera.     Palpitations     Interferons Other (See Comments)     Copaxone caused SEIZURES    Metronidazole Shortness Of Breath    Nitroglycerin Itching and Rash     Blisters & skin peeling    Shellfish-Derived Products Hives     Pt has never had a reaction to IV contrast Dye  Pt has never had a reaction to IV contrast Dye       Past Medical History:   Diagnosis Date    Anxiety     Autoimmune disease ([facility] [address]     MS    Endometriosis     Environmental and seasonal allergies     Family history of colonic polyps mother    Fever blister     GERD (gastroesophageal reflux disease)     controlled with medication     H/O echocardiogram 2017    EF 55-60%    History of palpitations     r/t medication     Internal hemorrhoids without mention of complication 6537    [facility]     Followed by Dr. Cyndi Espinoza, no medication at this time (19)    Ovarian cyst     Personal history of colonic polyps     TVA, TA    Rectocele     S/P cataract extraction and insertion of intraocular lens 3/3/2016     Past Surgical History:   Procedure Laterality Date    CATARACT REMOVAL Bilateral     with iol     SECTION      COLONOSCOPY  2014, 2017 -[facility]--1.5 cm sigmoid TVA, 5 small asc TAs--3 year recall;  -- mixed TVA    DILATION AND CURETTAGE OF UTERUS  2019    ENDOMETRIAL BIOPSY       benign    PELVIC LAPAROSCOPY      laproscopic for endometriosis x14     Family History   Problem Relation Age of Onset    Hypertension Mother     Hypertension Father     Diabetes Father     Heart Disease Father     Elevated Lipids Father     Coronary Art Dis Father         stents x2    Diabetes Brother         uncontrolled    Elevated Lipids Brother     Hypertension Brother     Stroke Brother         also had a brain bleed but unknown cause    Breast Cancer Paternal Aunt     Heart Disease Maternal Grandfather     Stroke Paternal Grandmother     Heart Disease Paternal Grandmother     Breast Cancer Maternal Grandmother 79      Social History     Tobacco Use    Smoking status: Former     Packs/day: 0.50     Years: 17.00     Pack years: 8.50     Types: Cigarettes     Quit date: 2001     Years since quittin.7    Smokeless tobacco: Never   Substance Use Topics    Alcohol use: No       Social History     Substance and Sexual Activity   Sexual Activity Not Currently     OB History    Para Term  AB Living   1 1 0 0 0 0   SAB IAB Ectopic Molar Multiple Live Births   0 0 0 0 0 0      # Outcome Date GA Lbr Zan/2nd Weight Sex Delivery Anes PTL Lv   1 Para               Obstetric Comments              ROS:    Review of Systems  General: Not Present- Chills, Fever, Fatigue, Insomnia, Hot flashes/Night sweats, Weight gain  Skin: Not Present- Bruising, Change in Wart/Mole, Excessive Sweating, Itching, Nail Changes, New Lesions, Rash, Skin Color Changes and Ulcer. HEENT: Not Present- Headache, Blurred Vision, Double Vision, Glaucoma, Visual Disturbances, Hearing Loss, Ringing in the Ears, Vertigo, Nose Bleed, Bleeding Gums, Hoarseness and Sore Throat. Neck: Not Present- Neck Pain and Neck Swelling. Respiratory: Not Present- Cough, Difficulty Breathing and Difficulty Breathing on Exertion. Breast: Not Present- Breast Mass, Breast Pain, Breast Swelling, Nipple Discharge, Nipple Pain, Recent Breast Size Changes and Skin Changes. Cardiovascular: Not Present- Abnormal Blood Pressure, Chest Pain, Edema, Fainting / Blacking Out, Palpitations, Shortness of Breath and Swelling of Extremities. Gastrointestinal: Not Present- Abdominal Pain, Abdominal Swelling, Bloating, Change in Bowel Habits, Constipation, Diarrhea, Difficulty Swallowing, Gets full quickly at meals, Nausea, Rectal Bleeding and Vomiting. Female Genitourinary: Not Present- Dysmenorrhea, Dyspareunia, Decreased libido, Excessive Menstrual Bleeding, Menstrual Irregularities, Pelvic Pain, Urinary Complaints, Vaginal Discharge, Vaginal itching/burning, Vaginal odor  Musculoskeletal: Not Present- Joint Pain and Muscle Pain. Neurological: Not Present- Dizziness, Fainting, Headaches and Seizures. Psychiatric: Not Present- Anxiety, Depression, Mood changes and Panic Attacks. Endocrine: Not Present- Appetite Changes, Cold Intolerance, Excessive Thirst, Excessive Urination and Heat Intolerance. Hematology: Not Present- Abnormal Bleeding, Easy Bruising and Enlarged Lymph Nodes.        PHYSICAL EXAM:    /80   Ht 5' 5\" (1.651 m)   Wt 173 lb (78.5 kg)   BMI 28.79 kg/m²     Constitutional: Vital signs are normal. She appears well-developed and well-nourished. She is active and cooperative. Neurological: She is alert. Nursing note and vitals reviewed. Medical problems and test results were reviewed with the patient today. ASSESSMENT and PLAN    1. Thickened endometrium  -     US NON OB TRANSVAGINAL     HISTORY: follow up thickened endo   COMPARISON: Ultrasound 6/21/22---5wks and 5.7mm endo   -------------------------------------------------------------------------------------------------------   Uterus appears normal   Endo = 1.4mm and appears normal   Rt ovy appears normal   Lt ovy appears normal   No free fluid seen   Date: 09/22/2022 Perf. Physician: Dr. Lianet Ibrahim MD Sonographer: Shikha Guerra, [address] [place] [address] done in my office and discussed with patient. Reassured        Time:  I spent  30 minutes in preparing to see patient (including chart review and preparation), obtaining and/or reviewing additional medical history, performing a physical exam and evaluation, documenting clinical information in the electronic health record, independently interpreting results, communicating results to patient, family or caregiver, and/or coordinating care. No follow-ups on file.        Selvin Cr MD No

## 2024-06-21 DIAGNOSIS — J30.1 ALLERGIC RHINITIS DUE TO POLLEN, UNSPECIFIED SEASONALITY: ICD-10-CM

## 2024-06-21 RX ORDER — AZELASTINE HYDROCHLORIDE 137 UG/1
SPRAY, METERED NASAL
Refills: 2 | OUTPATIENT
Start: 2024-06-21

## 2024-08-20 DIAGNOSIS — J30.1 ALLERGIC RHINITIS DUE TO POLLEN, UNSPECIFIED SEASONALITY: ICD-10-CM

## 2024-08-20 RX ORDER — AZELASTINE HYDROCHLORIDE 137 UG/1
SPRAY, METERED NASAL
Refills: 2 | OUTPATIENT
Start: 2024-08-20

## 2024-09-23 ENCOUNTER — OFFICE VISIT (OUTPATIENT)
Dept: INTERNAL MEDICINE CLINIC | Facility: CLINIC | Age: 59
End: 2024-09-23
Payer: COMMERCIAL

## 2024-09-23 VITALS
DIASTOLIC BLOOD PRESSURE: 86 MMHG | SYSTOLIC BLOOD PRESSURE: 144 MMHG | WEIGHT: 176.6 LBS | OXYGEN SATURATION: 98 % | HEART RATE: 68 BPM | BODY MASS INDEX: 28.5 KG/M2

## 2024-09-23 DIAGNOSIS — R45.89 ANXIETY ABOUT HEALTH: ICD-10-CM

## 2024-09-23 DIAGNOSIS — I10 PRIMARY HYPERTENSION: Primary | ICD-10-CM

## 2024-09-23 PROCEDURE — 3077F SYST BP >= 140 MM HG: CPT | Performed by: NURSE PRACTITIONER

## 2024-09-23 PROCEDURE — 3079F DIAST BP 80-89 MM HG: CPT | Performed by: NURSE PRACTITIONER

## 2024-09-23 PROCEDURE — 99214 OFFICE O/P EST MOD 30 MIN: CPT | Performed by: NURSE PRACTITIONER

## 2024-09-23 RX ORDER — HYDROCHLOROTHIAZIDE 25 MG/1
25 TABLET ORAL EVERY MORNING
Qty: 90 TABLET | Refills: 1 | Status: SHIPPED | OUTPATIENT
Start: 2024-09-23

## 2024-09-23 RX ORDER — GABAPENTIN 100 MG/1
100 CAPSULE ORAL 2 TIMES DAILY
COMMUNITY
Start: 2024-06-25

## 2024-09-23 SDOH — ECONOMIC STABILITY: FOOD INSECURITY: WITHIN THE PAST 12 MONTHS, YOU WORRIED THAT YOUR FOOD WOULD RUN OUT BEFORE YOU GOT MONEY TO BUY MORE.: PATIENT DECLINED

## 2024-09-23 SDOH — ECONOMIC STABILITY: FOOD INSECURITY: WITHIN THE PAST 12 MONTHS, THE FOOD YOU BOUGHT JUST DIDN'T LAST AND YOU DIDN'T HAVE MONEY TO GET MORE.: PATIENT DECLINED

## 2024-09-23 SDOH — ECONOMIC STABILITY: INCOME INSECURITY: HOW HARD IS IT FOR YOU TO PAY FOR THE VERY BASICS LIKE FOOD, HOUSING, MEDICAL CARE, AND HEATING?: PATIENT DECLINED

## 2024-09-23 NOTE — PROGRESS NOTES
Surgical History:   Procedure Laterality Date    CATARACT REMOVAL Bilateral     with iol     SECTION      COLONOSCOPY  2014, 2017    2014 -[facility]--1.5 cm sigmoid TVA, 5 small asc TAs--3 year recall;  -- mixed TVA    DILATION AND CURETTAGE OF UTERUS  2019    ENDOMETRIAL BIOPSY       benign    PELVIC LAPAROSCOPY      laproscopic for endometriosis x14       Current Outpatient Medications   Medication Sig Dispense Refill    gabapentin (NEURONTIN) 100 MG capsule Take 1 capsule by mouth 2 times daily.      pantoprazole (PROTONIX) 40 MG tablet Take 1 tablet by mouth daily Indications: Gastroesophageal Reflux Disease 100 tablet 3    atorvastatin (LIPITOR) 10 MG tablet Take 1 tablet by mouth every evening Indications: High Amount of Fats in the Blood 100 tablet 3    hydrocortisone 2.5 % cream APPLY TO AFFECTED AREA 3 TIMES A DAY      cyanocobalamin 1000 MCG/ML injection Inject 1 mL into the muscle every 30 days 3 mL 3    ergocalciferol (ERGOCALCIFEROL) 1.25 MG (73411 UT) capsule Take 1 capsule by mouth every 14 days 8 capsule 3    ALPRAZolam (XANAX) 0.5 MG tablet TAKE 1 TABLET EVERY DAY AS NEEDED      EPINEPHrine (EPIPEN) 0.3 MG/0.3ML SOAJ injection USE AS DIRECTED AS NEEDED      loratadine (CLARITIN) 10 MG tablet TAKE 1 TABLET BY MOUTH EVERY DAY       No current facility-administered medications for this visit.         Reviewed and updated this visit by provider:  Tobacco  Allergies  Meds  Problems  Med Hx  Surg Hx  Fam Hx         Review of Systems   Respiratory:  Negative for shortness of breath.    Cardiovascular:  Positive for palpitations. Negative for chest pain and leg swelling.   Psychiatric/Behavioral:  Positive for sleep disturbance. Negative for dysphoric mood and suicidal ideas. The patient is nervous/anxious.         OBJECTIVE:    BP (!) 144/86   Pulse 68   Wt 80.1 kg (176 lb 9.6 oz)   SpO2 98%   BMI 28.50 kg/m²      Physical Exam  Vitals and nursing note reviewed.

## 2024-09-23 NOTE — PATIENT INSTRUCTIONS
Start Hydrochlorothiazide 25 mg every morning.    Drink six 8 ounce glasses of water per day    Reduce sodium to 3,000 mg or less

## 2024-10-04 ENCOUNTER — OFFICE VISIT (OUTPATIENT)
Age: 59
End: 2024-10-04
Payer: COMMERCIAL

## 2024-10-04 VITALS
DIASTOLIC BLOOD PRESSURE: 90 MMHG | BODY MASS INDEX: 27.97 KG/M2 | WEIGHT: 174 LBS | SYSTOLIC BLOOD PRESSURE: 148 MMHG | HEART RATE: 74 BPM | HEIGHT: 66 IN

## 2024-10-04 DIAGNOSIS — I10 PRIMARY HYPERTENSION: ICD-10-CM

## 2024-10-04 DIAGNOSIS — R00.2 PALPITATIONS: Primary | ICD-10-CM

## 2024-10-04 DIAGNOSIS — R00.2 PALPITATIONS: ICD-10-CM

## 2024-10-04 LAB
ANION GAP SERPL CALC-SCNC: 15 MMOL/L (ref 9–18)
BUN SERPL-MCNC: 9 MG/DL (ref 8–23)
CALCIUM SERPL-MCNC: 10 MG/DL (ref 8.8–10.2)
CHLORIDE SERPL-SCNC: 99 MMOL/L (ref 98–107)
CO2 SERPL-SCNC: 26 MMOL/L (ref 20–28)
CREAT SERPL-MCNC: 0.83 MG/DL (ref 0.6–1.1)
GLUCOSE SERPL-MCNC: 93 MG/DL (ref 70–99)
MAGNESIUM SERPL-MCNC: 2.1 MG/DL (ref 1.8–2.4)
POTASSIUM SERPL-SCNC: 3.4 MMOL/L (ref 3.5–5.1)
SODIUM SERPL-SCNC: 140 MMOL/L (ref 136–145)
TSH, 3RD GENERATION: 1.45 UIU/ML (ref 0.27–4.2)

## 2024-10-04 PROCEDURE — 3077F SYST BP >= 140 MM HG: CPT | Performed by: INTERNAL MEDICINE

## 2024-10-04 PROCEDURE — 99214 OFFICE O/P EST MOD 30 MIN: CPT | Performed by: INTERNAL MEDICINE

## 2024-10-04 PROCEDURE — 3079F DIAST BP 80-89 MM HG: CPT | Performed by: INTERNAL MEDICINE

## 2024-10-04 PROCEDURE — 93000 ELECTROCARDIOGRAM COMPLETE: CPT | Performed by: INTERNAL MEDICINE

## 2024-10-04 RX ORDER — NEBIVOLOL 5 MG/1
5 TABLET ORAL DAILY
Qty: 90 TABLET | Refills: 3 | Status: SHIPPED | OUTPATIENT
Start: 2024-10-04

## 2024-10-04 NOTE — PATIENT INSTRUCTIONS
time.  Your pulse. (If your heart is beating fast, it may be hard to count your pulse.)  If your heart rhythm was regular or irregular.  What you were doing when the palpitations started.  How long the palpitations lasted.  Any other symptoms.  What may have helped your symptoms go away.  If an activity causes palpitations, slow down or stop. Talk to your doctor before you do that activity again.  Take your medicines exactly as prescribed. Call your doctor if you think you are having a problem with your medicine.  When should you call for help?   Call 911 anytime you think you may need emergency care. For example, call if:    You passed out (lost consciousness).     You have symptoms of a heart attack. These may include:  Chest pain or pressure, or a strange feeling in the chest.  Sweating.  Shortness of breath.  Pain, pressure, or a strange feeling in the back, neck, jaw, or upper belly or in one or both shoulders or arms.  Lightheadedness or sudden weakness.  A fast or irregular heartbeat.  After you call 911, the  may tell you to chew 1 adult-strength or 2 to 4 low-dose aspirin. Wait for an ambulance. Do not try to drive yourself.     You have symptoms of a stroke. These may include:  Sudden numbness, tingling, weakness, or loss of movement in your face, arm, or leg, especially on only one side of your body.  Sudden vision changes.  Sudden trouble speaking.  Sudden confusion or trouble understanding simple statements.  Sudden problems with walking or balance.  A sudden, severe headache that is different from past headaches.   Call your doctor now or seek immediate medical care if:    You have heart palpitations and:  Are dizzy or lightheaded, or you feel like you may faint.  Have new or increased shortness of breath.   Watch closely for changes in your health, and be sure to contact your doctor if:    You continue to have heart palpitations.   Where can you learn more?  Go to

## 2024-10-04 NOTE — PROGRESS NOTES
08:24 AM    CO2 25 04/26/2024 08:24 AM    BUN 12 04/26/2024 08:24 AM    CREATININE 0.81 04/26/2024 08:24 AM    GLUCOSE 82 04/26/2024 08:24 AM    CALCIUM 9.8 04/26/2024 08:24 AM          EKG    SR 74  first degree AV block  low voltage  normal axis  LAE  normal ST/T    CXR/IMAGING        DEVICE INTERROGATION        OUTSIDE RECORDS REVIEW    Records from outside providers have been reviewed and summarized as noted in the HPI, past history and data review sections of this note, and reviewed with patient. .       ASSESSMENT and PLAN    Helena was seen today for hypertension and hyperlipidemia.    Diagnoses and all orders for this visit:    Palpitations  -     EKG 12 Lead  -     TSH; Future  -     Magnesium; Future  -     Basic Metabolic Panel; Future    Primary hypertension  -     TSH; Future  -     Magnesium; Future  -     Basic Metabolic Panel; Future    Other orders  -     nebivolol (BYSTOLIC) 5 MG tablet; Take 1 tablet by mouth daily          IMPRESSION:    Symptoms starting to improve with hctz.  I do not think that repeating a monitor at this time is the best  use of her resources.  Much of the heart pounding, headache, etc related to elevated bp in turn related to high stress and high dietary Na.  She might benefit from utilizing a beta blocker for BP and for the peripheral symptoms of anxiety, such as heart pounding.  Reassured her ok to take with her xanax and more than ok to resume exercise, in fact recommend gradually intensifying it.      Will check thyroid and electrolytes.     Seeing her PCP again in about a month, I will plan to see her again in 6 months if all stable in the interim    Discussed nonpharmacologic means to lower BP:  Regular moderate physical activity throughout the day with at least 30 minutes of sustained activity may lower BP up to 25%. Avoiding the salt shaker and sodium rich processed foods will lower blood pressure.  Limiting alcohol will significantly lower blood pressure.  Finally,

## 2024-10-06 PROBLEM — G35 RELAPSING REMITTING MULTIPLE SCLEROSIS (HCC): Status: ACTIVE | Noted: 2024-10-06

## 2024-10-07 ENCOUNTER — TELEPHONE (OUTPATIENT)
Age: 59
End: 2024-10-07

## 2024-10-07 NOTE — RESULT ENCOUNTER NOTE
Potassium a little low on the hctz.  Options are to stop it and continue with nebivolol alone, continue at its current dose and add a potassium supplement, or halve it, continue nebivolol, and follow a high potassium diet (vegetables and fruit) and repeat the potassium in a couple of weeks.  My recommendation is the last, but any are reasonable.  How would she like to proceed?

## 2024-10-07 NOTE — TELEPHONE ENCOUNTER
----- Message from Dr. Daniela Tyson MD sent at 10/7/2024  7:21 AM EDT -----  Potassium a little low on the hctz.  Options are to stop it and continue with nebivolol alone, continue at its current dose and add a potassium supplement, or halve it, continue nebivolol, and follow a high potassium diet (vegetables and fruit) and repeat the potassium in a couple of weeks.  My recommendation is the last, but any are reasonable.  How would she like to proceed?

## 2024-10-07 NOTE — TELEPHONE ENCOUNTER
Called and spoke with patient she is going to do the last option, half hctz and eat more potassium rich foods.

## 2024-10-08 DIAGNOSIS — I10 PRIMARY HYPERTENSION: ICD-10-CM

## 2024-10-08 RX ORDER — HYDROCHLOROTHIAZIDE 25 MG/1
12.5 TABLET ORAL EVERY MORNING
COMMUNITY
Start: 2024-10-08

## 2024-10-17 DIAGNOSIS — E78.00 PURE HYPERCHOLESTEROLEMIA: Chronic | ICD-10-CM

## 2024-10-17 DIAGNOSIS — K21.9 GASTROESOPHAGEAL REFLUX DISEASE, UNSPECIFIED WHETHER ESOPHAGITIS PRESENT: ICD-10-CM

## 2024-10-17 DIAGNOSIS — R93.1 ELEVATED CORONARY ARTERY CALCIUM SCORE: Chronic | ICD-10-CM

## 2024-10-17 LAB
ALBUMIN SERPL-MCNC: 3.6 G/DL (ref 3.2–4.6)
ALBUMIN/GLOB SERPL: 1.2 (ref 1–1.9)
ALP SERPL-CCNC: 69 U/L (ref 35–104)
ALT SERPL-CCNC: 20 U/L (ref 8–45)
ANION GAP SERPL CALC-SCNC: 9 MMOL/L (ref 9–18)
AST SERPL-CCNC: 16 U/L (ref 15–37)
BASOPHILS # BLD: 0.1 K/UL (ref 0–0.2)
BASOPHILS NFR BLD: 1 % (ref 0–2)
BILIRUB SERPL-MCNC: 0.5 MG/DL (ref 0–1.2)
BUN SERPL-MCNC: 8 MG/DL (ref 8–23)
CALCIUM SERPL-MCNC: 9.5 MG/DL (ref 8.8–10.2)
CHLORIDE SERPL-SCNC: 104 MMOL/L (ref 98–107)
CHOLEST SERPL-MCNC: 160 MG/DL (ref 0–200)
CO2 SERPL-SCNC: 25 MMOL/L (ref 20–28)
CREAT SERPL-MCNC: 0.79 MG/DL (ref 0.6–1.1)
DIFFERENTIAL METHOD BLD: NORMAL
EOSINOPHIL # BLD: 0.1 K/UL (ref 0–0.8)
EOSINOPHIL NFR BLD: 2 % (ref 0.5–7.8)
ERYTHROCYTE [DISTWIDTH] IN BLOOD BY AUTOMATED COUNT: 12.4 % (ref 11.9–14.6)
GLOBULIN SER CALC-MCNC: 3.1 G/DL (ref 2.3–3.5)
GLUCOSE SERPL-MCNC: 85 MG/DL (ref 70–99)
HCT VFR BLD AUTO: 43 % (ref 35.8–46.3)
HDLC SERPL-MCNC: 57 MG/DL (ref 40–60)
HDLC SERPL: 2.8 (ref 0–5)
HGB BLD-MCNC: 14.1 G/DL (ref 11.7–15.4)
IMM GRANULOCYTES # BLD AUTO: 0 K/UL (ref 0–0.5)
IMM GRANULOCYTES NFR BLD AUTO: 1 % (ref 0–5)
LDLC SERPL CALC-MCNC: 83 MG/DL (ref 0–100)
LYMPHOCYTES # BLD: 1.7 K/UL (ref 0.5–4.6)
LYMPHOCYTES NFR BLD: 25 % (ref 13–44)
MCH RBC QN AUTO: 29.3 PG (ref 26.1–32.9)
MCHC RBC AUTO-ENTMCNC: 32.8 G/DL (ref 31.4–35)
MCV RBC AUTO: 89.4 FL (ref 82–102)
MONOCYTES # BLD: 0.4 K/UL (ref 0.1–1.3)
MONOCYTES NFR BLD: 6 % (ref 4–12)
NEUTS SEG # BLD: 4.4 K/UL (ref 1.7–8.2)
NEUTS SEG NFR BLD: 65 % (ref 43–78)
NRBC # BLD: 0 K/UL (ref 0–0.2)
PLATELET # BLD AUTO: 250 K/UL (ref 150–450)
PMV BLD AUTO: 10.2 FL (ref 9.4–12.3)
POTASSIUM SERPL-SCNC: 4.2 MMOL/L (ref 3.5–5.1)
PROT SERPL-MCNC: 6.7 G/DL (ref 6.3–8.2)
RBC # BLD AUTO: 4.81 M/UL (ref 4.05–5.2)
SODIUM SERPL-SCNC: 139 MMOL/L (ref 136–145)
TRIGL SERPL-MCNC: 98 MG/DL (ref 0–150)
TSH, 3RD GENERATION: 1.64 UIU/ML (ref 0.27–4.2)
VLDLC SERPL CALC-MCNC: 20 MG/DL (ref 6–23)
WBC # BLD AUTO: 6.7 K/UL (ref 4.3–11.1)

## 2024-11-06 ENCOUNTER — OFFICE VISIT (OUTPATIENT)
Dept: INTERNAL MEDICINE CLINIC | Facility: CLINIC | Age: 59
End: 2024-11-06
Payer: COMMERCIAL

## 2024-11-06 VITALS
HEART RATE: 73 BPM | OXYGEN SATURATION: 97 % | BODY MASS INDEX: 28.15 KG/M2 | DIASTOLIC BLOOD PRESSURE: 74 MMHG | WEIGHT: 174.4 LBS | SYSTOLIC BLOOD PRESSURE: 130 MMHG

## 2024-11-06 DIAGNOSIS — E87.6 HYPOKALEMIA: ICD-10-CM

## 2024-11-06 DIAGNOSIS — E55.9 VITAMIN D DEFICIENCY: Chronic | ICD-10-CM

## 2024-11-06 DIAGNOSIS — G35 HX OF MULTIPLE SCLEROSIS (HCC): ICD-10-CM

## 2024-11-06 DIAGNOSIS — I10 PRIMARY HYPERTENSION: Primary | ICD-10-CM

## 2024-11-06 DIAGNOSIS — J01.10 ACUTE NON-RECURRENT FRONTAL SINUSITIS: ICD-10-CM

## 2024-11-06 LAB — POTASSIUM SERPL-SCNC: 3.8 MMOL/L (ref 3.5–5.1)

## 2024-11-06 PROCEDURE — 3078F DIAST BP <80 MM HG: CPT | Performed by: INTERNAL MEDICINE

## 2024-11-06 PROCEDURE — 3075F SYST BP GE 130 - 139MM HG: CPT | Performed by: INTERNAL MEDICINE

## 2024-11-06 PROCEDURE — 99214 OFFICE O/P EST MOD 30 MIN: CPT | Performed by: INTERNAL MEDICINE

## 2024-11-06 RX ORDER — AZITHROMYCIN 250 MG/1
TABLET, FILM COATED ORAL
Qty: 6 TABLET | Refills: 0 | Status: SHIPPED | OUTPATIENT
Start: 2024-11-06 | End: 2024-11-16

## 2024-11-06 RX ORDER — ERGOCALCIFEROL 1.25 MG/1
50000 CAPSULE ORAL
Qty: 8 CAPSULE | Refills: 1 | Status: SHIPPED | OUTPATIENT
Start: 2024-11-06

## 2024-11-06 ASSESSMENT — ENCOUNTER SYMPTOMS
SINUS PRESSURE: 1
COUGH: 1

## 2024-11-06 NOTE — PROGRESS NOTES
She has been sick for a few days with a productive cough after exposure to an illness.   Her home BP has been well controlled.   Her daughter lives in [place]     Medication Refill  This is a new problem. Associated symptoms include coughing. Pertinent negatives include no chills or fever.   Cough  Pertinent negatives include no chills or fever.       Past Medical History, Past Surgical History, Family history, Social History, and Medications were all reviewed with the patient today and updated as necessary.       Current Outpatient Medications   Medication Sig Dispense Refill    ergocalciferol (ERGOCALCIFEROL) 1.25 MG (02305 UT) capsule Take 1 capsule by mouth every 14 days 8 capsule 1    azithromycin (ZITHROMAX) 250 MG tablet 500mg on day 1 followed by 250mg on days 2 - 5 6 tablet 0    hydroCHLOROthiazide (HYDRODIURIL) 25 MG tablet Take 0.5 tablets by mouth every morning      nebivolol (BYSTOLIC) 5 MG tablet Take 1 tablet by mouth daily 90 tablet 3    pantoprazole (PROTONIX) 40 MG tablet Take 1 tablet by mouth daily Indications: Gastroesophageal Reflux Disease 100 tablet 3    atorvastatin (LIPITOR) 10 MG tablet Take 1 tablet by mouth every evening Indications: High Amount of Fats in the Blood 100 tablet 3    hydrocortisone 2.5 % cream APPLY TO AFFECTED AREA 3 TIMES A DAY      cyanocobalamin 1000 MCG/ML injection Inject 1 mL into the muscle every 30 days 3 mL 3    ALPRAZolam (XANAX) 0.5 MG tablet TAKE 1 TABLET EVERY DAY AS NEEDED      EPINEPHrine (EPIPEN) 0.3 MG/0.3ML SOAJ injection USE AS DIRECTED AS NEEDED      loratadine (CLARITIN) 10 MG tablet TAKE 1 TABLET BY MOUTH EVERY DAY      gabapentin (NEURONTIN) 100 MG capsule Take 1 capsule by mouth 2 times daily. (Patient not taking: Reported on 10/4/2024)       No current facility-administered medications for this visit.     Allergies   Allergen Reactions    Latex Itching    Aspirin Swelling and Angioedema    Casein Hives    Dimethyl Fumarate Other (See Comments)

## 2024-11-08 DIAGNOSIS — R05.2 SUBACUTE COUGH: Primary | ICD-10-CM

## 2024-11-08 RX ORDER — BENZONATATE 200 MG/1
200 CAPSULE ORAL 3 TIMES DAILY PRN
Qty: 30 CAPSULE | Refills: 0 | Status: SHIPPED | OUTPATIENT
Start: 2024-11-08 | End: 2024-11-18

## 2024-12-20 ENCOUNTER — HOSPITAL ENCOUNTER (OUTPATIENT)
Dept: MAMMOGRAPHY | Age: 59
Discharge: HOME | End: 2024-12-23
Payer: COMMERCIAL

## 2024-12-20 ENCOUNTER — TELEPHONE (OUTPATIENT)
Age: 59
End: 2024-12-20

## 2024-12-20 DIAGNOSIS — Z12.31 BREAST CANCER SCREENING BY MAMMOGRAM: ICD-10-CM

## 2024-12-20 PROCEDURE — 77063 BREAST TOMOSYNTHESIS BI: CPT

## 2024-12-20 NOTE — TELEPHONE ENCOUNTER
Daniela Tyson MD [employer] now (1:04 PM)     BM  If her right nipple is hurting and she's having hot flashes I would recommend follow up with primary care or gynecology.  Doesn't sound consistent with meds.  ER if worse.   I called and informed pt.of MD response.She just had mammogram this am.Has fu soon w/gynecology and will call them.

## 2024-12-20 NOTE — TELEPHONE ENCOUNTER
I spoke w/pt.she reports she has been getting hot flashes that last a while,waking 2-3 times a night to urinate,not sleeping well,rt.nipple is hurting.She started taking HCTZ 12.5mg qday and Bystolic 5mg qday and seems like these symptoms started after she started these medications.She has also lost 16 pounds since starting the HCTZ.PCP has quit so she has no PCP at this time but is looking.Please advise..

## 2024-12-20 NOTE — TELEPHONE ENCOUNTER
I feel like I am back in the middle of Menopause, hot flashes, a little breast pain, waking up 2-3 times. is this the medication?

## 2025-01-14 NOTE — PROGRESS NOTES
HPI    Helena Beckett is a 59 y.o. female seen for annual GYN exam.    Past Medical History, Past Surgical History, Family history, Social History, and Medications were all reviewed with the patient today and updated as necessary.     Current Outpatient Medications   Medication Sig    nystatin (MYCOSTATIN) 997346 UNIT/GM cream APPLY TOPICALLY 2 (TWO) TIMES A DAY AS NEEDED (ITCHING, IRRITATION) MIX WITH TRIAMCINOLONE CREAM    ergocalciferol (ERGOCALCIFEROL) 1.25 MG (51268 UT) capsule Take 1 capsule by mouth every 14 days    hydroCHLOROthiazide (HYDRODIURIL) 25 MG tablet Take 0.5 tablets by mouth every morning    nebivolol (BYSTOLIC) 5 MG tablet Take 1 tablet by mouth daily    pantoprazole (PROTONIX) 40 MG tablet Take 1 tablet by mouth daily Indications: Gastroesophageal Reflux Disease    atorvastatin (LIPITOR) 10 MG tablet Take 1 tablet by mouth every evening Indications: High Amount of Fats in the Blood    hydrocortisone 2.5 % cream APPLY TO AFFECTED AREA 3 TIMES A DAY    cyanocobalamin 1000 MCG/ML injection Inject 1 mL into the muscle every 30 days    ALPRAZolam (XANAX) 0.5 MG tablet TAKE 1 TABLET EVERY DAY AS NEEDED    EPINEPHrine (EPIPEN) 0.3 MG/0.3ML SOAJ injection USE AS DIRECTED AS NEEDED    loratadine (CLARITIN) 10 MG tablet TAKE 1 TABLET BY MOUTH EVERY DAY    escitalopram (LEXAPRO) 5 MG tablet TAKE 1/2 - 1 TABLET BY MOUTH IN THE EVENING. (Patient not taking: Reported on 1/16/2025)     No current facility-administered medications for this visit.     Allergies   Allergen Reactions    Latex Itching    Aspirin Swelling and Angioedema    Casein Hives    Dimethyl Fumarate Other (See Comments)     Hip pain severe dissipated on d/c Tecfidera.    Palpitations     Interferons Other (See Comments)     Copaxone caused SEIZURES    Metronidazole Shortness Of Breath    Milk (Cow) Hives     Stomach pain    Nitroglycerin Itching and Rash     Blisters & skin peeling    Shellfish-Derived Products Hives     Pt has never

## 2025-01-16 ENCOUNTER — OFFICE VISIT (OUTPATIENT)
Dept: OBGYN CLINIC | Age: 60
End: 2025-01-16
Payer: COMMERCIAL

## 2025-01-16 VITALS
WEIGHT: 178 LBS | HEIGHT: 65 IN | SYSTOLIC BLOOD PRESSURE: 124 MMHG | DIASTOLIC BLOOD PRESSURE: 80 MMHG | BODY MASS INDEX: 29.66 KG/M2

## 2025-01-16 DIAGNOSIS — Z01.419 WELL WOMAN EXAM: Primary | ICD-10-CM

## 2025-01-16 DIAGNOSIS — Z12.4 SCREENING FOR MALIGNANT NEOPLASM OF CERVIX: ICD-10-CM

## 2025-01-16 PROCEDURE — 3074F SYST BP LT 130 MM HG: CPT | Performed by: OBSTETRICS & GYNECOLOGY

## 2025-01-16 PROCEDURE — 3079F DIAST BP 80-89 MM HG: CPT | Performed by: OBSTETRICS & GYNECOLOGY

## 2025-01-16 PROCEDURE — 99459 PELVIC EXAMINATION: CPT | Performed by: OBSTETRICS & GYNECOLOGY

## 2025-01-16 PROCEDURE — 99396 PREV VISIT EST AGE 40-64: CPT | Performed by: OBSTETRICS & GYNECOLOGY

## 2025-01-16 RX ORDER — ESCITALOPRAM OXALATE 5 MG/1
TABLET ORAL
COMMUNITY
Start: 2024-12-09

## 2025-01-16 RX ORDER — NYSTATIN 100000 U/G
CREAM TOPICAL
COMMUNITY
Start: 2024-12-04

## 2025-01-21 LAB
COLLECTION METHOD: NORMAL
CYTOLOGIST CVX/VAG CYTO: NORMAL
CYTOLOGY CVX/VAG DOC THIN PREP: NORMAL
HPV REFLEX: NORMAL
Lab: NORMAL
PAP SOURCE: NORMAL
PATH REPORT.FINAL DX SPEC: NORMAL
STAT OF ADQ CVX/VAG CYTO-IMP: NORMAL

## 2025-01-23 NOTE — TELEPHONE ENCOUNTER
----- Message from Aarti Simental sent at 11/9/2023  1:48 PM EST -----  Subject: Message to Provider    QUESTIONS  Information for Provider? patient returning [facility] was unable to reach   practice, please return call or leave a message, sees provider Se Marte  ---------------------------------------------------------------------------  --------------  Abilio Toivola Cindy  [phone]; OK to leave message on voicemail  ---------------------------------------------------------------------------  --------------  SCRIPT ANSWERS  undefined
Returned the patient's call and she stated that someone from our office called and hung up. Informed patient that the call did not come from Toan Farias, NP office. Patient verbalized understanding and was appreciative for the return call.
N/A

## 2025-01-30 DIAGNOSIS — I10 PRIMARY HYPERTENSION: ICD-10-CM

## 2025-01-30 RX ORDER — HYDROCHLOROTHIAZIDE 25 MG/1
12.5 TABLET ORAL EVERY MORNING
Qty: 45 TABLET | Refills: 0 | Status: SHIPPED | OUTPATIENT
Start: 2025-01-30

## 2025-01-30 NOTE — TELEPHONE ENCOUNTER
Please advise. Patient has NP appt scheduled for 3/31/25 with Kendal. Patient needs refill of HCTZ. Rx pended for #45 with 0 refills.

## 2025-01-30 NOTE — TELEPHONE ENCOUNTER
Patient is calling requesting a refill on Hydrochlorothiazide 25 MG. States Dr. Perez forgot to refill at her last visit.    [facility]/pharmacy #[id number] - [place], [place] - [address] [place] [address] -  [phone] - F [phone].    Please advise.

## 2025-02-24 SDOH — HEALTH STABILITY: PHYSICAL HEALTH: ON AVERAGE, HOW MANY DAYS PER WEEK DO YOU ENGAGE IN MODERATE TO STRENUOUS EXERCISE (LIKE A BRISK WALK)?: 3 DAYS

## 2025-02-24 SDOH — HEALTH STABILITY: PHYSICAL HEALTH: ON AVERAGE, HOW MANY MINUTES DO YOU ENGAGE IN EXERCISE AT THIS LEVEL?: 50 MIN

## 2025-02-25 ENCOUNTER — OFFICE VISIT (OUTPATIENT)
Dept: FAMILY MEDICINE CLINIC | Facility: CLINIC | Age: 60
End: 2025-02-25
Payer: COMMERCIAL

## 2025-02-25 VITALS
BODY MASS INDEX: 28.7 KG/M2 | HEIGHT: 66 IN | DIASTOLIC BLOOD PRESSURE: 74 MMHG | RESPIRATION RATE: 16 BRPM | HEART RATE: 59 BPM | SYSTOLIC BLOOD PRESSURE: 128 MMHG | WEIGHT: 178.6 LBS | OXYGEN SATURATION: 98 %

## 2025-02-25 DIAGNOSIS — F41.9 ANXIETY: ICD-10-CM

## 2025-02-25 DIAGNOSIS — I10 PRIMARY HYPERTENSION: Primary | ICD-10-CM

## 2025-02-25 DIAGNOSIS — J30.1 ALLERGIC RHINITIS DUE TO POLLEN, UNSPECIFIED SEASONALITY: ICD-10-CM

## 2025-02-25 DIAGNOSIS — Z12.31 ENCOUNTER FOR SCREENING MAMMOGRAM FOR MALIGNANT NEOPLASM OF BREAST: ICD-10-CM

## 2025-02-25 DIAGNOSIS — E78.00 PURE HYPERCHOLESTEROLEMIA: Chronic | ICD-10-CM

## 2025-02-25 DIAGNOSIS — Z12.4 SCREENING FOR CERVICAL CANCER: ICD-10-CM

## 2025-02-25 DIAGNOSIS — Z13.1 SCREENING FOR DIABETES MELLITUS: ICD-10-CM

## 2025-02-25 DIAGNOSIS — K21.9 GASTROESOPHAGEAL REFLUX DISEASE, UNSPECIFIED WHETHER ESOPHAGITIS PRESENT: Chronic | ICD-10-CM

## 2025-02-25 DIAGNOSIS — E53.8 VITAMIN B12 DEFICIENCY: ICD-10-CM

## 2025-02-25 DIAGNOSIS — Z12.11 SCREENING FOR COLON CANCER: ICD-10-CM

## 2025-02-25 DIAGNOSIS — G35 HX OF MULTIPLE SCLEROSIS (HCC): ICD-10-CM

## 2025-02-25 DIAGNOSIS — Z00.00 LABORATORY EXAM ORDERED AS PART OF ROUTINE GENERAL MEDICAL EXAMINATION: ICD-10-CM

## 2025-02-25 DIAGNOSIS — R00.2 PALPITATIONS: ICD-10-CM

## 2025-02-25 DIAGNOSIS — E55.9 VITAMIN D DEFICIENCY: Chronic | ICD-10-CM

## 2025-02-25 DIAGNOSIS — Z23 ENCOUNTER FOR IMMUNIZATION: ICD-10-CM

## 2025-02-25 PROBLEM — H81.4 VERTIGO OF CENTRAL ORIGIN, UNSPECIFIED LATERALITY: Status: RESOLVED | Noted: 2023-07-10 | Resolved: 2025-02-25

## 2025-02-25 PROBLEM — J30.81 ALLERGIC RHINITIS DUE TO ANIMAL HAIR AND DANDER: Status: RESOLVED | Noted: 2023-12-29 | Resolved: 2025-02-25

## 2025-02-25 PROBLEM — Z79.899 ENCOUNTER FOR MEDICATION MANAGEMENT: Status: RESOLVED | Noted: 2024-01-22 | Resolved: 2025-02-25

## 2025-02-25 PROBLEM — J01.10 ACUTE NON-RECURRENT FRONTAL SINUSITIS: Status: RESOLVED | Noted: 2024-11-06 | Resolved: 2025-02-25

## 2025-02-25 LAB
25(OH)D3 SERPL-MCNC: 48.2 NG/ML (ref 30–100)
ALBUMIN SERPL-MCNC: 3.8 G/DL (ref 3.2–4.6)
ALBUMIN/GLOB SERPL: 1.2 (ref 1–1.9)
ALP SERPL-CCNC: 66 U/L (ref 35–104)
ALT SERPL-CCNC: 19 U/L (ref 8–45)
ANION GAP SERPL CALC-SCNC: 13 MMOL/L (ref 7–16)
AST SERPL-CCNC: 14 U/L (ref 15–37)
BASOPHILS # BLD: 0.02 K/UL (ref 0–0.2)
BASOPHILS NFR BLD: 0.2 % (ref 0–2)
BILIRUB SERPL-MCNC: 0.5 MG/DL (ref 0–1.2)
BUN SERPL-MCNC: 12 MG/DL (ref 8–23)
CALCIUM SERPL-MCNC: 10.1 MG/DL (ref 8.8–10.2)
CHLORIDE SERPL-SCNC: 101 MMOL/L (ref 98–107)
CO2 SERPL-SCNC: 25 MMOL/L (ref 20–29)
CREAT SERPL-MCNC: 0.79 MG/DL (ref 0.6–1.1)
DIFFERENTIAL METHOD BLD: ABNORMAL
EOSINOPHIL # BLD: 0.16 K/UL (ref 0–0.8)
EOSINOPHIL NFR BLD: 1.2 % (ref 0.5–7.8)
ERYTHROCYTE [DISTWIDTH] IN BLOOD BY AUTOMATED COUNT: 12.4 % (ref 11.9–14.6)
EST. AVERAGE GLUCOSE BLD GHB EST-MCNC: 119 MG/DL
GLOBULIN SER CALC-MCNC: 3.2 G/DL (ref 2.3–3.5)
GLUCOSE SERPL-MCNC: 86 MG/DL (ref 70–99)
HBA1C MFR BLD: 5.8 % (ref 0–5.6)
HCT VFR BLD AUTO: 43.4 % (ref 35.8–46.3)
HGB BLD-MCNC: 14.9 G/DL (ref 11.7–15.4)
IMM GRANULOCYTES # BLD AUTO: 0.12 K/UL (ref 0–0.5)
IMM GRANULOCYTES NFR BLD AUTO: 0.9 % (ref 0–5)
LYMPHOCYTES # BLD: 3.07 K/UL (ref 0.5–4.6)
LYMPHOCYTES NFR BLD: 23.9 % (ref 13–44)
MCH RBC QN AUTO: 29.2 PG (ref 26.1–32.9)
MCHC RBC AUTO-ENTMCNC: 34.3 G/DL (ref 31.4–35)
MCV RBC AUTO: 84.9 FL (ref 82–102)
MONOCYTES # BLD: 0.93 K/UL (ref 0.1–1.3)
MONOCYTES NFR BLD: 7.2 % (ref 4–12)
NEUTS SEG # BLD: 8.57 K/UL (ref 1.7–8.2)
NEUTS SEG NFR BLD: 66.6 % (ref 43–78)
NRBC # BLD: 0 K/UL (ref 0–0.2)
PLATELET # BLD AUTO: 301 K/UL (ref 150–450)
PMV BLD AUTO: 10 FL (ref 9.4–12.3)
POTASSIUM SERPL-SCNC: 3.7 MMOL/L (ref 3.5–5.1)
PROT SERPL-MCNC: 7 G/DL (ref 6.3–8.2)
RBC # BLD AUTO: 5.11 M/UL (ref 4.05–5.2)
SODIUM SERPL-SCNC: 139 MMOL/L (ref 136–145)
TSH W FREE THYROID IF ABNORMAL: 1.58 UIU/ML (ref 0.27–4.2)
VIT B12 SERPL-MCNC: 424 PG/ML (ref 193–986)
WBC # BLD AUTO: 12.9 K/UL (ref 4.3–11.1)

## 2025-02-25 PROCEDURE — 3074F SYST BP LT 130 MM HG: CPT | Performed by: NURSE PRACTITIONER

## 2025-02-25 PROCEDURE — 3078F DIAST BP <80 MM HG: CPT | Performed by: NURSE PRACTITIONER

## 2025-02-25 PROCEDURE — 99214 OFFICE O/P EST MOD 30 MIN: CPT | Performed by: NURSE PRACTITIONER

## 2025-02-25 RX ORDER — SERTRALINE HYDROCHLORIDE 25 MG/1
25 TABLET, FILM COATED ORAL DAILY
COMMUNITY

## 2025-02-25 SDOH — ECONOMIC STABILITY: FOOD INSECURITY: WITHIN THE PAST 12 MONTHS, THE FOOD YOU BOUGHT JUST DIDN'T LAST AND YOU DIDN'T HAVE MONEY TO GET MORE.: NEVER TRUE

## 2025-02-25 SDOH — ECONOMIC STABILITY: FOOD INSECURITY: WITHIN THE PAST 12 MONTHS, YOU WORRIED THAT YOUR FOOD WOULD RUN OUT BEFORE YOU GOT MONEY TO BUY MORE.: NEVER TRUE

## 2025-02-25 ASSESSMENT — ENCOUNTER SYMPTOMS
BLOOD IN STOOL: 0
CHEST TIGHTNESS: 0
ABDOMINAL DISTENTION: 0
VOICE CHANGE: 0
SHORTNESS OF BREATH: 0
WHEEZING: 0
APNEA: 0
BACK PAIN: 0
CONSTIPATION: 0
SINUS PRESSURE: 0
RHINORRHEA: 0
RESPIRATORY NEGATIVE: 1
DIARRHEA: 0
CHOKING: 0
NAUSEA: 0
EYE DISCHARGE: 0
ABDOMINAL PAIN: 0
COUGH: 0
RECTAL PAIN: 0
STRIDOR: 0
EYES NEGATIVE: 1
VOMITING: 0
EYE PAIN: 0
FACIAL SWELLING: 0
ANAL BLEEDING: 0
SINUS PAIN: 0
COLOR CHANGE: 0
TROUBLE SWALLOWING: 0
SORE THROAT: 0

## 2025-02-25 ASSESSMENT — PATIENT HEALTH QUESTIONNAIRE - PHQ9
SUM OF ALL RESPONSES TO PHQ9 QUESTIONS 1 & 2: 0
SUM OF ALL RESPONSES TO PHQ QUESTIONS 1-9: 0
SUM OF ALL RESPONSES TO PHQ QUESTIONS 1-9: 0
1. LITTLE INTEREST OR PLEASURE IN DOING THINGS: NOT AT ALL
SUM OF ALL RESPONSES TO PHQ QUESTIONS 1-9: 0
2. FEELING DOWN, DEPRESSED OR HOPELESS: NOT AT ALL
SUM OF ALL RESPONSES TO PHQ QUESTIONS 1-9: 0

## 2025-02-25 NOTE — PROGRESS NOTES
[place] [address]  Phone: [phone] Fax [phone]  James Schilling MS, APRN, FNP-C  2/25/2025  Chief Complaint   Patient presents with   • New Patient     Pt is new to me but not to the system here today to establish care. Pt coming from [facility]-last seen there 11/6/24. Please see ROS/Assessment and Plan section for full details of all items addressed during today's appointment.         ASSESSMENT/PLAN:  Below is the assessment and plan developed based on review of pertinent history, physical exam, labs, studies, and medications.    1. Primary hypertension  Pt reports having HTN. Pt reports taking HCTZ 25 mg tab-0.5 tab (12.5 mg) po daily, Bystolic 5 mg po daily, and following DASH/heart healthy diet as directed. Pt reports BP WNL when she is checking it. Pt's BP was WNL in office today 128/74 (goal <140/90). Discussed with pt. Will have pt continue HCTZ and Bystolic at same doses and continue DASH/heart healthy diet. Will have pt monitor BP closely. Pt to f/u with me in 4 weeks for VV. Will monitor.   - Comprehensive Metabolic Panel; Future    2. Pure hypercholesterolemia  Pt reports having HLD. Pt reports taking Lipitor 10 mg po daily and following DASH/heart healthy diet as directed. Discussed with pt. Will have pt continue Lipitor at same dose for now and continue DASH/heart healthy diet. Pt to return fasting prior to VV f/u with me in 4 weeks to have lipids drawn. Will adjust dose of Lipitor if needed based on result. Will monitor.   - Lipid Panel; Future    3. Vitamin D deficiency  Pt reports having Vitamin D deficiency. Pt reports taking high dose Vitamin D 50 K units po every 2 weeks as directed. Discussed with pt. Will have pt continue high dose Vitamin D as directed for now. Will check Vitamin D level today and adjust dose if needed based on result. Pt to f/u with me in 4 weeks for VV. Will monitor.   - Vitamin D 25

## 2025-02-26 ENCOUNTER — TELEPHONE (OUTPATIENT)
Dept: FAMILY MEDICINE CLINIC | Facility: CLINIC | Age: 60
End: 2025-02-26

## 2025-02-26 DIAGNOSIS — D72.829 LEUKOCYTOSIS, UNSPECIFIED TYPE: Primary | ICD-10-CM

## 2025-02-26 RX ORDER — DOXYCYCLINE HYCLATE 100 MG
100 TABLET ORAL 2 TIMES DAILY
Qty: 14 TABLET | Refills: 0 | Status: SHIPPED | OUTPATIENT
Start: 2025-02-26 | End: 2025-03-05

## 2025-02-26 NOTE — TELEPHONE ENCOUNTER
Spoke with patient and she is feeling sinus pressure and has had some congestion along with bloody nose mucus.

## 2025-03-05 ENCOUNTER — LAB (OUTPATIENT)
Dept: FAMILY MEDICINE CLINIC | Facility: CLINIC | Age: 60
End: 2025-03-05

## 2025-03-05 DIAGNOSIS — D72.829 LEUKOCYTOSIS, UNSPECIFIED TYPE: ICD-10-CM

## 2025-03-05 DIAGNOSIS — E78.00 PURE HYPERCHOLESTEROLEMIA: Chronic | ICD-10-CM

## 2025-03-05 LAB
BASOPHILS # BLD: 0.05 K/UL (ref 0–0.2)
BASOPHILS NFR BLD: 0.7 % (ref 0–2)
CHOLEST SERPL-MCNC: 146 MG/DL (ref 0–200)
DIFFERENTIAL METHOD BLD: NORMAL
EOSINOPHIL # BLD: 0.13 K/UL (ref 0–0.8)
EOSINOPHIL NFR BLD: 1.7 % (ref 0.5–7.8)
ERYTHROCYTE [DISTWIDTH] IN BLOOD BY AUTOMATED COUNT: 12.7 % (ref 11.9–14.6)
HCT VFR BLD AUTO: 43.1 % (ref 35.8–46.3)
HDLC SERPL-MCNC: 63 MG/DL (ref 40–60)
HDLC SERPL: 2.3 (ref 0–5)
HGB BLD-MCNC: 14.4 G/DL (ref 11.7–15.4)
IMM GRANULOCYTES # BLD AUTO: 0.04 K/UL (ref 0–0.5)
IMM GRANULOCYTES NFR BLD AUTO: 0.5 % (ref 0–5)
LDLC SERPL CALC-MCNC: 71 MG/DL (ref 0–100)
LYMPHOCYTES # BLD: 1.86 K/UL (ref 0.5–4.6)
LYMPHOCYTES NFR BLD: 24.4 % (ref 13–44)
MCH RBC QN AUTO: 29 PG (ref 26.1–32.9)
MCHC RBC AUTO-ENTMCNC: 33.4 G/DL (ref 31.4–35)
MCV RBC AUTO: 86.7 FL (ref 82–102)
MONOCYTES # BLD: 0.47 K/UL (ref 0.1–1.3)
MONOCYTES NFR BLD: 6.2 % (ref 4–12)
NEUTS SEG # BLD: 5.08 K/UL (ref 1.7–8.2)
NEUTS SEG NFR BLD: 66.5 % (ref 43–78)
NRBC # BLD: 0 K/UL (ref 0–0.2)
PLATELET # BLD AUTO: 231 K/UL (ref 150–450)
PMV BLD AUTO: 10 FL (ref 9.4–12.3)
RBC # BLD AUTO: 4.97 M/UL (ref 4.05–5.2)
TRIGL SERPL-MCNC: 60 MG/DL (ref 0–150)
VLDLC SERPL CALC-MCNC: 12 MG/DL (ref 6–23)
WBC # BLD AUTO: 7.6 K/UL (ref 4.3–11.1)

## 2025-03-10 ENCOUNTER — OFFICE VISIT (OUTPATIENT)
Dept: ORTHOPEDIC SURGERY | Age: 60
End: 2025-03-10
Payer: COMMERCIAL

## 2025-03-10 VITALS — HEIGHT: 66 IN | BODY MASS INDEX: 28.93 KG/M2 | WEIGHT: 180 LBS

## 2025-03-10 DIAGNOSIS — M25.511 RIGHT SHOULDER PAIN, UNSPECIFIED CHRONICITY: Primary | ICD-10-CM

## 2025-03-10 PROCEDURE — 99203 OFFICE O/P NEW LOW 30 MIN: CPT | Performed by: PHYSICIAN ASSISTANT

## 2025-03-10 NOTE — PROGRESS NOTES
Name: Helena Beckett  YOB: 1965  Gender: female  MRN: [id number]    CC: Shoulder Pain (R)     HPI: Helena Beckett is a 60 y.o. female who presents with Shoulder Pain (R)    History of Present Illness  The patient presents for evaluation of shoulder pain.    She was previously diagnosed with a shoulder spur approximately 7 to 8 years ago, for which she received a cortisone injection. She has been engaged in Pilates for the past 3 years. Dr. Larry, an orthopedic specialist, gave her a cortisone injection in her shoulder, which provided relief after 2 weeks. The pain persisted in the anterior aspect of her shoulder for an additional week. She experiences difficulty in performing certain Pilates exercises and she has been managing for at least a month. She also reports neck issues, which she believes are a result of compensatory mechanisms. She has not sought physical therapy for her current condition. She is scheduled for a PRP treatment in her wrist tomorrow and has not taken any anti-inflammatory medications. She has abstained from Pilates for a week and has been participating in line dancing.     ROS/Meds/PSH/PMH/FH/SH: I personally reviewed the patients standard intake form.  Below are the pertinents    Tobacco:  reports that she quit smoking about 24 years ago. Her smoking use included cigarettes. She started smoking about 41 years ago. She has a 8.5 pack-year smoking history. She has never used smokeless tobacco.  Diabetes: none  Other: GERD, hypertension, retention, anxiety, hx of MS    Physical Examination:  General: no acute distress  Lungs: breathing easily  CV: regular rhythm by pulse  Right Shoulder: Full range of motion present.  5/5 rotator strength present.  Negative impingement symptoms.  Tender palpate over the biceps tendon.  Positive speeds.  Negative Bureau's..  Motor and sensory intact distally.  Distal radius pulse 2+.    Imaging:     Shoulder XR: Grashey, Axillary and

## 2025-03-25 ENCOUNTER — TELEMEDICINE (OUTPATIENT)
Dept: FAMILY MEDICINE CLINIC | Facility: CLINIC | Age: 60
End: 2025-03-25
Payer: COMMERCIAL

## 2025-03-25 DIAGNOSIS — F41.9 ANXIETY: ICD-10-CM

## 2025-03-25 DIAGNOSIS — D72.829 LEUKOCYTOSIS, UNSPECIFIED TYPE: ICD-10-CM

## 2025-03-25 DIAGNOSIS — E53.8 VITAMIN B12 DEFICIENCY: ICD-10-CM

## 2025-03-25 DIAGNOSIS — I10 PRIMARY HYPERTENSION: Primary | ICD-10-CM

## 2025-03-25 DIAGNOSIS — R73.03 PREDIABETES: ICD-10-CM

## 2025-03-25 DIAGNOSIS — E78.00 PURE HYPERCHOLESTEROLEMIA: ICD-10-CM

## 2025-03-25 DIAGNOSIS — Z12.31 ENCOUNTER FOR SCREENING MAMMOGRAM FOR MALIGNANT NEOPLASM OF BREAST: ICD-10-CM

## 2025-03-25 DIAGNOSIS — Z23 ENCOUNTER FOR IMMUNIZATION: ICD-10-CM

## 2025-03-25 DIAGNOSIS — G35 HX OF MULTIPLE SCLEROSIS (HCC): ICD-10-CM

## 2025-03-25 DIAGNOSIS — Z12.11 SCREENING FOR COLON CANCER: ICD-10-CM

## 2025-03-25 DIAGNOSIS — E55.9 VITAMIN D DEFICIENCY: ICD-10-CM

## 2025-03-25 DIAGNOSIS — K21.9 GASTROESOPHAGEAL REFLUX DISEASE, UNSPECIFIED WHETHER ESOPHAGITIS PRESENT: ICD-10-CM

## 2025-03-25 DIAGNOSIS — L65.9 ALOPECIA: ICD-10-CM

## 2025-03-25 DIAGNOSIS — J30.1 ALLERGIC RHINITIS DUE TO POLLEN, UNSPECIFIED SEASONALITY: ICD-10-CM

## 2025-03-25 DIAGNOSIS — R00.2 PALPITATIONS: ICD-10-CM

## 2025-03-25 DIAGNOSIS — Z12.4 SCREENING FOR CERVICAL CANCER: ICD-10-CM

## 2025-03-25 PROCEDURE — 99213 OFFICE O/P EST LOW 20 MIN: CPT | Performed by: NURSE PRACTITIONER

## 2025-03-25 NOTE — PROGRESS NOTES
[place] [address]  Phone: [phone] Fax [phone]  James Schilling MS, APRN, FNP-C  3/25/2025    Helena Beckett is a 60 y.o. female who was seen by synchronous (real-time) audio-video technology on 3/25/2025 for Follow-up (Pt for VV f/u today to recheck chronic conditions. Please see ROS/Assessment and Plan section for full details of all items addressed during today's appointment. )      Assessment & Plan:     1. Primary hypertension  Pt has HTN. Pt reports taking HCTZ 25 mg tab-0.5 tab (12.5 mg) po daily, Bystolic 5 mg po daily, and following DASH/heart healthy/low carb diet as directed. Pt reports BP WNL when she is checking it. Pt's BP was WNL at most recent in visit with me 2/25/25 at 128/74 (goal <140/90). Discussed with pt. Will have pt continue HCTZ and Bystolic at same doses and continue DASH/heart healthy/low carb diet. Will have pt monitor BP closely. Pt to f/u with me in office in 6 months. Will monitor.   - Comprehensive Metabolic Panel; Future    2. Pure hypercholesterolemia  Pt has HLD. Pt reports taking Lipitor 10 mg po daily and following DASH/heart healthy/low carb diet as directed. On 3/5/25, pt's trigs were WNL 60. HDL looked good at 63. LDL-C was WNL 71. Discussed with pt. Will have pt continue Lipitor at same dose and continue DASH/heart healthy/low carb diet. Will recheck fasting lipids prior to in office f/u with me in 6 months. Will monitor.   - Lipid Panel; Future    3. Prediabetes  Pt has prediabetes. Pt reports following DASH/heart healthy/low carb diet as directed. On 2/25/25, pt's A1C was in early prediabetic range at 5.8%. Discussed with pt. Will have pt continue DASH/heart healthy/low carb diet. Will recheck fasting CMP/A1C prior to in office f/u with me in 6 months. Will monitor.   - Comprehensive Metabolic Panel; Future  - Hemoglobin A1C; Future    4. Vitamin D deficiency  Pt has Vitamin D deficiency. Pt reports

## 2025-04-03 NOTE — PROGRESS NOTES
Consider downloading a free donny to your phone or smart watch to perform this as a part of your daily hygiene practices.      Patient is encouraged to engage in moderate physical activity for at least 30 minutes on at least 6 days of the week and be active throughout the day. Follow a diet rich in whole grains, fruits and vegetables, proven to reduce the incidence of events related to cardiovascular disease.  For more detailed information, patient is referred to www.cardiosmart.org and to the document, \"Food as Medicine Jumpstart\", from the American College of Lifestyle Medicine.         Return in about 6 months (around 10/4/2025). If stable then go to annual follow up          Thank you for allowing me to participate in this patient's care.  Please call or contact me if there are any questions or concerns regarding the above.      MARI PAINTER MD  04/04/25  8:51 AM

## 2025-04-04 ENCOUNTER — OFFICE VISIT (OUTPATIENT)
Age: 60
End: 2025-04-04
Payer: COMMERCIAL

## 2025-04-04 VITALS
WEIGHT: 174 LBS | HEART RATE: 64 BPM | BODY MASS INDEX: 27.97 KG/M2 | HEIGHT: 66 IN | DIASTOLIC BLOOD PRESSURE: 62 MMHG | SYSTOLIC BLOOD PRESSURE: 116 MMHG

## 2025-04-04 DIAGNOSIS — I10 PRIMARY HYPERTENSION: Primary | ICD-10-CM

## 2025-04-04 DIAGNOSIS — R00.2 PALPITATIONS: ICD-10-CM

## 2025-04-04 PROCEDURE — 99214 OFFICE O/P EST MOD 30 MIN: CPT | Performed by: INTERNAL MEDICINE

## 2025-04-04 PROCEDURE — 3074F SYST BP LT 130 MM HG: CPT | Performed by: INTERNAL MEDICINE

## 2025-04-04 PROCEDURE — 3078F DIAST BP <80 MM HG: CPT | Performed by: INTERNAL MEDICINE

## 2025-04-24 ENCOUNTER — HOSPITAL ENCOUNTER (OUTPATIENT)
Dept: PHYSICAL THERAPY | Age: 60
Setting detail: RECURRING SERIES
Discharge: HOME | End: 2025-04-27
Payer: COMMERCIAL

## 2025-04-24 DIAGNOSIS — M25.511 ACUTE PAIN OF RIGHT SHOULDER: Primary | ICD-10-CM

## 2025-04-24 PROCEDURE — 97110 THERAPEUTIC EXERCISES: CPT

## 2025-04-24 PROCEDURE — 97161 PT EVAL LOW COMPLEX 20 MIN: CPT

## 2025-04-24 NOTE — THERAPY EVALUATION
Therapeutic Activites, and Therapeutic Exercise/Strengthening     Goals: (Goals have been discussed and agreed upon with patient.) In Progress Goal Met  Goal Not met   Short-Term Functional Goals: Time Frame: 3 weeks      1.Helena Beckett will be independent with HEP [] [] []   2.Helena Beckett will improve shoulder ER strength to 4/5 [] [] []   3.Helena Beckett will improve pain tenderness to upper trap . [] [] []         Discharge Goals: Time Frame: 6 weeks      1.Helena Beckett will be able to Overhead press 10 lbs lbs to be able to place items on top shelf of cabinets [] [] []   2.Helena Beckett will be able to Amin carry 10 lbs to be able to help carry bags for work or groceries [] [] []   3.Helena Beckett will be able to Push pull 70 lbs to be able to help with push a cart for activities of daily living. [] [] []     Goal Summary: To be determined at discharge.           Medical Necessity:     Helena Beckett will benefit from skilled physical therapy (medically necessary) to address above deficits affecting participation in basic ADLs and functional mobility/tolerance. Patient will benefit from manual therapeutic techniques (stretching, joint mobilizations, soft tissue mobilization/myofascial release), therapeutic exercises and activities, postural strengthening/education, and comprehensive home exercises program to address current impairments and functional limitations.     Reason For Services/Other Comments:  Patient continues to require skilled intervention due to patient continues to present with impairments assessed at initial evaluation and requiring skilled physical therapy to meet goals for PT.        Regarding Helena Beckett's therapy, I certify that the treatment plan above will be carried out by a therapist or under their direction.  Thank you for this referral,  Domenico Bailey, PT     Referring Physician Signature: Mery Cota PA                    Charge Capture  Events

## 2025-04-25 NOTE — PROGRESS NOTES
Helena Friedmanlin  : 1965  Primary: [employer] ([facility])  Secondary:  [facility] [place] [address] [place] [address]  Phone: [phone]  Fax: [phone] Plan Frequency: 2xs a week for 6 weeks    Plan of Care/Certification Expiration Date: 25        Plan of Care/Certification Expiration Date:  Plan of Care/Certification Expiration Date: 25    Frequency/Duration: Plan Frequency: 2xs a week for 6 weeks      Time In/Out:   Time In: 1500  Time Out: 1550      PT Visit Info:         Visit Count:  1    OUTPATIENT PHYSICAL THERAPY:   Treatment Note 2025       Episode  (Right shoulder Pain)               Treatment Diagnosis:    Acute pain of right shoulder  Medical/Referring Diagnosis:    Right shoulder pain, unspecified chronicity    Referring Physician:  Mery Cota PA MD Orders:  PT Eval and Treat   Return MD Appt:     Date of Onset:  No data recorded   Allergies:   Latex, Aspirin, Casein, Dimethyl fumarate, Interferons, Metronidazole, Milk (cow), Nitroglycerin, Shellfish-derived products, and Bc fast pain relief [aspirin-salicylamide-caffeine]  Restrictions/Precautions:   None      Interventions Planned (Treatment may consist of any combination of the following):     See Assessment Note    Subjective Comments:   See EvaL  Initial Pain Level:      /10  Post Session Pain Level:       /10  Medications Last Reviewed: 2025  Updated Objective Findings:  None Today  Treatment     .  THERAPEUTIC EXERCISE: (10 minutes):    Exercises per grid below to improve mobility, strength, balance, and coordination.   Progressed resistance and repetitions as indicated.     Date:  2025 Date:   Date:     Activity/Exercise Parameters Parameters Parameters   Edu POC, EDU, pain management, Driving requirements, Sleep progressions, Aggs and Eases, Contraindications, HEP, Expectations, Goals,        isometrics ER IR     No money with bands around

## 2025-04-28 ENCOUNTER — HOSPITAL ENCOUNTER (OUTPATIENT)
Dept: PHYSICAL THERAPY | Age: 60
Setting detail: RECURRING SERIES
End: 2025-04-28
Payer: COMMERCIAL

## 2025-05-12 ENCOUNTER — OFFICE VISIT (OUTPATIENT)
Dept: ORTHOPEDIC SURGERY | Age: 60
End: 2025-05-12
Payer: COMMERCIAL

## 2025-05-12 DIAGNOSIS — M25.511 RIGHT SHOULDER PAIN, UNSPECIFIED CHRONICITY: Primary | ICD-10-CM

## 2025-05-12 DIAGNOSIS — M75.21 BICEPS TENDINITIS OF RIGHT UPPER EXTREMITY: ICD-10-CM

## 2025-05-12 PROCEDURE — 99213 OFFICE O/P EST LOW 20 MIN: CPT | Performed by: ORTHOPAEDIC SURGERY

## 2025-05-12 PROCEDURE — 20611 DRAIN/INJ JOINT/BURSA W/US: CPT | Performed by: ORTHOPAEDIC SURGERY

## 2025-05-12 RX ORDER — TRIAMCINOLONE ACETONIDE 40 MG/ML
40 INJECTION, SUSPENSION INTRA-ARTICULAR; INTRAMUSCULAR ONCE
Status: COMPLETED | OUTPATIENT
Start: 2025-05-12 | End: 2025-05-12

## 2025-05-12 RX ADMIN — TRIAMCINOLONE ACETONIDE 40 MG: 40 INJECTION, SUSPENSION INTRA-ARTICULAR; INTRAMUSCULAR at 10:34

## 2025-05-12 NOTE — PROGRESS NOTES
Name: Helena Beckett  YOB: 1965  Gender: female  MRN: [id number]    CC: Shoulder Pain (R)     HPI: Helena Beckett is a 60 y.o. female who returns for follow up on the right shoulder.  At her last appointment on 3/10/2025 she was seen by Mery Cota PA-C who diagnosed her with biceps tendinitis.  She was sent to formal physical therapy and they discussed a biceps tendon sheath injection.  At that time she was about to have a PRP injection in her thumb and not want to proceed with any steroid injections in this right shoulder.  They decided to hold off on this injection and she went to physical therapy.  She had 1 physical therapy session with Domenico at our [place] location and has been unable to make sessions after that.  She states that she is not having pain today but she has been resting this shoulder.  She continued complaint of anterior shoulder pain especially with of her Pilates movements.      Physical Examination:  General: no acute distress  Lungs: breathing easily  CV: regular rhythm by pulse  Right Shoulder: Full shoulder range of motion present.  Tender to palpate along her biceps tendon.  Positive speeds.  Positive Christian's.  5 out of 5 rotator cuff strength.  Distal radial pulse 2+.  Motor and sensor intact distally.      Assessment:     ICD-10-CM    1. Right shoulder pain, unspecified chronicity  M25.511 US ARTHR/ASP/INJ MAJOR JNT/BURSA RIGHT     triamcinolone acetonide (KENALOG-40) injection 40 mg      2. Biceps tendinitis of right upper extremity  M75.21         Plan:     Pain is coming from biceps labral pathology.  We discussed risk benefits indications of a cortisone injection today which she like to proceed with.  She is now over 2 months out from her PRP injection in her thumb.  She will continue work with physical therapy.  She will pay attention to this injection.  We discussed at her next visit I would like to know how much help her and for how long.  She will

## 2025-05-12 NOTE — ADDENDUM NOTE
Addended by: RAY PADRON on: 5/12/2025 01:41 PM     Modules accepted: Level of Service     3 (mild pain)

## 2025-05-12 NOTE — PROGRESS NOTES
Name: Helena Beckett  YOB: 1965  Gender: female  MRN: [id number]    CC: Shoulder Pain (R)     HPI: Helena Beckett is a 60 y.o. female who returns for follow up on the right shoulder.  At her last appointment on 3/10/2025 she was seen by Mery Cota PA-C who diagnosed her with biceps tendinitis.  She was sent to formal physical therapy and they discussed a biceps tendon sheath injection.  At that time she was about to have a PRP injection in her thumb and not want to proceed with any steroid injections in this right shoulder.  They decided to hold off on this injection and she went to physical therapy.  She had 1 physical therapy session with Domenico at our [place] location and has been unable to make sessions after that.  She states that she is not having pain today but she has been resting this shoulder.  She continued complaint of anterior shoulder pain especially with of her Pilates movements.      Physical Examination:  General: no acute distress  Lungs: breathing easily  CV: regular rhythm by pulse  Right Shoulder: Full shoulder range of motion present.  Tender to palpate along her biceps tendon.  Positive speeds.  Positive Bay's.  5 out of 5 rotator cuff strength.  Distal radial pulse 2+.  Motor and sensor intact distally.      Assessment:     ICD-10-CM    1. Right shoulder pain, unspecified chronicity  M25.511 US ARTHR/ASP/INJ MAJOR JNT/BURSA RIGHT     triamcinolone acetonide (KENALOG-40) injection 40 mg      2. Biceps tendinitis of right upper extremity  M75.21         Plan:     Pain is coming from biceps labral pathology.  We discussed risk benefits indications of a cortisone injection today which she like to proceed with.  She is now over 2 months out from her PRP injection in her thumb.  She will continue work with physical therapy.  She will pay attention to this injection.  We discussed at her next visit I would like to know how much help her and for how long.  She will

## 2025-05-28 ENCOUNTER — OFFICE VISIT (OUTPATIENT)
Dept: NEUROLOGY | Age: 60
End: 2025-05-28
Payer: COMMERCIAL

## 2025-05-28 VITALS
WEIGHT: 181 LBS | OXYGEN SATURATION: 98 % | BODY MASS INDEX: 29.23 KG/M2 | SYSTOLIC BLOOD PRESSURE: 126 MMHG | DIASTOLIC BLOOD PRESSURE: 67 MMHG | HEART RATE: 60 BPM

## 2025-05-28 DIAGNOSIS — G35 MULTIPLE SCLEROSIS, RELAPSING-REMITTING (HCC): Primary | ICD-10-CM

## 2025-05-28 DIAGNOSIS — Z86.69 HISTORY OF OPTIC NEURITIS: ICD-10-CM

## 2025-05-28 PROCEDURE — 99214 OFFICE O/P EST MOD 30 MIN: CPT | Performed by: NURSE PRACTITIONER

## 2025-05-28 PROCEDURE — 3074F SYST BP LT 130 MM HG: CPT | Performed by: NURSE PRACTITIONER

## 2025-05-28 PROCEDURE — 3078F DIAST BP <80 MM HG: CPT | Performed by: NURSE PRACTITIONER

## 2025-05-28 ASSESSMENT — PATIENT HEALTH QUESTIONNAIRE - PHQ9
SUM OF ALL RESPONSES TO PHQ QUESTIONS 1-9: 0
2. FEELING DOWN, DEPRESSED OR HOPELESS: NOT AT ALL
SUM OF ALL RESPONSES TO PHQ QUESTIONS 1-9: 0
1. LITTLE INTEREST OR PLEASURE IN DOING THINGS: NOT AT ALL
DEPRESSION UNABLE TO ASSESS: FUNCTIONAL CAPACITY MOTIVATION LIMITS ACCURACY

## 2025-05-28 NOTE — PROGRESS NOTES
[facility]  [address], [address]  [place] [address]  Phone: [phone] Fax [phone]  GEOVANNA Rush      Patient: Helena Beckett   Provider: GEOVANNA Rush    CC:   Chief Complaint   Patient presents with    Other     NTP    Multiple Sclerosis               History of Present Illness:     Helena Beckett is a 60 y.o.  female who presents for evaluation of MS.     History of Present Illness  The patient presents for evaluation of multiple sclerosis (MS).    She was diagnosed with MS in 2004, following the birth of her daughter in 2003. Initially, her symptoms were attributed to postpartum depression. She sought consultations from various healthcare professionals, including nurse practitioners and ophthalmologists, suspecting a vision-related issue. Despite receiving several prescriptions for corrective lenses, her symptoms persisted. A physician, overhearing her conversation about her symptoms, suggested the possibility of MS, prompting her to undergo testing. A lumbar puncture confirmed the diagnosis of MS.     Her symptoms, which included a sensation of the room being slanted, began immediately after childbirth and continued for the subsequent year. Prior to 2004, she did not experience any unusual neurological symptoms such as numbness, tingling, weakness, loss of vision. She did receive PO steroid taper. She has tried copaxone, rebif, and tecfidera. Had a seizure on rebif and one additionally approx 6 months after stopping this but none since. She is unsure the date of this.       Over the past year, she reports difficulty assessing the progression of her MS due to aging. She also reports worsening vision. Now with new onset diplopia. She has trouble with driving over things like bridges or focusing on water. \"My eyes  feel like I'm going to go over the edge\". She also reports difficulty with navigating the scanning motion of the grocery store. Hasn't done vestibular rehab

## 2025-05-28 NOTE — ASSESSMENT & PLAN NOTE
New onset diplopia x 6 months, will repeat MRI. Will send to vestibular rehab and also to neuroopthamology (Dr. Anglin).     Encouraged stretching and exercise. Consider baclofen 10mg at bedtime Prn but patient declined.

## 2025-06-04 ENCOUNTER — HOSPITAL ENCOUNTER (OUTPATIENT)
Age: 60
Discharge: HOME | End: 2025-06-06
Payer: COMMERCIAL

## 2025-06-04 DIAGNOSIS — G35 MULTIPLE SCLEROSIS, RELAPSING-REMITTING (HCC): ICD-10-CM

## 2025-06-04 PROCEDURE — A9579 GAD-BASE MR CONTRAST NOS,1ML: HCPCS | Performed by: NURSE PRACTITIONER

## 2025-06-04 PROCEDURE — 70553 MRI BRAIN STEM W/O & W/DYE: CPT

## 2025-06-04 PROCEDURE — 6360000004 HC RX CONTRAST MEDICATION: Performed by: NURSE PRACTITIONER

## 2025-06-04 RX ADMIN — GADOTERIDOL 17 ML: 279.3 INJECTION, SOLUTION INTRAVENOUS at 17:37

## 2025-06-05 ENCOUNTER — RESULTS FOLLOW-UP (OUTPATIENT)
Dept: NEUROLOGY | Age: 60
End: 2025-06-05

## 2025-06-09 DIAGNOSIS — E53.8 VITAMIN B12 DEFICIENCY: ICD-10-CM

## 2025-06-09 RX ORDER — CYANOCOBALAMIN 1000 UG/ML
1000 INJECTION, SOLUTION INTRAMUSCULAR; SUBCUTANEOUS
Qty: 3 ML | Refills: 3 | Status: SHIPPED | OUTPATIENT
Start: 2025-06-09

## 2025-08-04 ENCOUNTER — OFFICE VISIT (OUTPATIENT)
Dept: ORTHOPEDIC SURGERY | Age: 60
End: 2025-08-04

## 2025-08-04 DIAGNOSIS — M75.21 BICEPS TENDINITIS OF RIGHT UPPER EXTREMITY: Primary | ICD-10-CM

## 2025-08-04 RX ORDER — TRIAMCINOLONE ACETONIDE 40 MG/ML
40 INJECTION, SUSPENSION INTRA-ARTICULAR; INTRAMUSCULAR ONCE
Status: COMPLETED | OUTPATIENT
Start: 2025-08-04 | End: 2025-08-04

## 2025-08-04 RX ADMIN — TRIAMCINOLONE ACETONIDE 40 MG: 40 INJECTION, SUSPENSION INTRA-ARTICULAR; INTRAMUSCULAR at 11:02

## 2025-08-18 ENCOUNTER — PATIENT MESSAGE (OUTPATIENT)
Age: 60
End: 2025-08-18

## 2025-08-18 ENCOUNTER — HOSPITAL ENCOUNTER (OUTPATIENT)
Dept: PHYSICAL THERAPY | Age: 60
Setting detail: RECURRING SERIES
Discharge: HOME | End: 2025-08-21
Attending: ORTHOPAEDIC SURGERY
Payer: COMMERCIAL

## 2025-08-18 ENCOUNTER — TELEPHONE (OUTPATIENT)
Age: 60
End: 2025-08-18

## 2025-08-18 DIAGNOSIS — M54.2 CERVICALGIA: ICD-10-CM

## 2025-08-18 DIAGNOSIS — M62.81 MUSCLE WEAKNESS (GENERALIZED): ICD-10-CM

## 2025-08-18 DIAGNOSIS — R00.1 BRADYCARDIA, UNSPECIFIED: Primary | ICD-10-CM

## 2025-08-18 DIAGNOSIS — R06.02 SHORTNESS OF BREATH: ICD-10-CM

## 2025-08-18 DIAGNOSIS — M25.511 CHRONIC RIGHT SHOULDER PAIN: Primary | ICD-10-CM

## 2025-08-18 DIAGNOSIS — G89.29 CHRONIC RIGHT SHOULDER PAIN: Primary | ICD-10-CM

## 2025-08-18 PROCEDURE — 97161 PT EVAL LOW COMPLEX 20 MIN: CPT

## 2025-08-18 PROCEDURE — 97140 MANUAL THERAPY 1/> REGIONS: CPT

## 2025-08-18 PROCEDURE — 97112 NEUROMUSCULAR REEDUCATION: CPT

## 2025-08-18 PROCEDURE — 97110 THERAPEUTIC EXERCISES: CPT

## 2025-08-18 ASSESSMENT — PAIN SCALES - GENERAL: PAINLEVEL_OUTOF10: 3

## 2025-08-25 ENCOUNTER — HOSPITAL ENCOUNTER (OUTPATIENT)
Dept: PHYSICAL THERAPY | Age: 60
Setting detail: RECURRING SERIES
Discharge: HOME | End: 2025-08-28
Attending: ORTHOPAEDIC SURGERY
Payer: COMMERCIAL

## 2025-08-25 PROCEDURE — 97110 THERAPEUTIC EXERCISES: CPT

## 2025-08-25 PROCEDURE — 97140 MANUAL THERAPY 1/> REGIONS: CPT

## 2025-08-25 PROCEDURE — 97112 NEUROMUSCULAR REEDUCATION: CPT

## 2025-08-25 ASSESSMENT — PAIN SCALES - GENERAL: PAINLEVEL_OUTOF10: 2

## 2025-08-27 ENCOUNTER — HOSPITAL ENCOUNTER (OUTPATIENT)
Dept: PHYSICAL THERAPY | Age: 60
Setting detail: RECURRING SERIES
Discharge: HOME | End: 2025-08-30
Attending: ORTHOPAEDIC SURGERY
Payer: COMMERCIAL

## 2025-08-27 ENCOUNTER — PATIENT MESSAGE (OUTPATIENT)
Dept: FAMILY MEDICINE CLINIC | Facility: CLINIC | Age: 60
End: 2025-08-27

## 2025-08-27 DIAGNOSIS — R93.1 ELEVATED CORONARY ARTERY CALCIUM SCORE: Chronic | ICD-10-CM

## 2025-08-27 DIAGNOSIS — E78.00 PURE HYPERCHOLESTEROLEMIA: Chronic | ICD-10-CM

## 2025-08-27 PROCEDURE — 97110 THERAPEUTIC EXERCISES: CPT

## 2025-08-27 PROCEDURE — 97140 MANUAL THERAPY 1/> REGIONS: CPT

## 2025-08-27 PROCEDURE — 97112 NEUROMUSCULAR REEDUCATION: CPT

## 2025-08-27 RX ORDER — ATORVASTATIN CALCIUM 10 MG/1
10 TABLET, FILM COATED ORAL EVERY EVENING
Qty: 90 TABLET | Refills: 1 | Status: SHIPPED | OUTPATIENT
Start: 2025-08-27

## 2025-08-27 ASSESSMENT — PAIN SCALES - GENERAL: PAINLEVEL_OUTOF10: 2

## 2025-08-28 ENCOUNTER — TELEMEDICINE (OUTPATIENT)
Dept: NEUROLOGY | Age: 60
End: 2025-08-28
Payer: COMMERCIAL

## 2025-08-28 DIAGNOSIS — R25.2 SPASTICITY: ICD-10-CM

## 2025-08-28 DIAGNOSIS — G35 MULTIPLE SCLEROSIS, RELAPSING-REMITTING (HCC): Primary | ICD-10-CM

## 2025-08-28 PROCEDURE — 99214 OFFICE O/P EST MOD 30 MIN: CPT | Performed by: NURSE PRACTITIONER

## 2025-09-03 ENCOUNTER — HOSPITAL ENCOUNTER (OUTPATIENT)
Dept: PHYSICAL THERAPY | Age: 60
Setting detail: RECURRING SERIES
Discharge: HOME | End: 2025-09-06
Attending: ORTHOPAEDIC SURGERY
Payer: COMMERCIAL

## 2025-09-03 PROCEDURE — 97140 MANUAL THERAPY 1/> REGIONS: CPT

## 2025-09-03 PROCEDURE — 97112 NEUROMUSCULAR REEDUCATION: CPT

## 2025-09-03 PROCEDURE — 97110 THERAPEUTIC EXERCISES: CPT

## 2025-09-03 ASSESSMENT — PAIN SCALES - GENERAL: PAINLEVEL_OUTOF10: 2

## 2025-09-05 ENCOUNTER — PATIENT MESSAGE (OUTPATIENT)
Dept: FAMILY MEDICINE CLINIC | Facility: CLINIC | Age: 60
End: 2025-09-05

## 2025-09-05 DIAGNOSIS — E55.9 VITAMIN D DEFICIENCY: Chronic | ICD-10-CM

## 2025-09-05 RX ORDER — ERGOCALCIFEROL 1.25 MG/1
50000 CAPSULE ORAL
Qty: 6 CAPSULE | Refills: 1 | Status: SHIPPED | OUTPATIENT
Start: 2025-09-05

## (undated) DEVICE — CYSTO: Brand: MEDLINE INDUSTRIES, INC.

## (undated) DEVICE — DRAPE,UNDERBUTTOCKS,PCH,STERILE: Brand: MEDLINE

## (undated) DEVICE — AMD ANTIMICROBIAL NON-ADHERENT PAD,0.2% POLYHEXAMETHYLENE BIGUANIDE HCI (PHMB): Brand: TELFA

## (undated) DEVICE — SOL IRRIGATION INJ NACL 0.9% 500ML BTL

## (undated) DEVICE — CONTAINER SPEC FRMLN 120ML --

## (undated) DEVICE — CARDINAL HEALTH FLEXIBLE LIGHT HANDLE COVER: Brand: CARDINAL HEALTH

## (undated) DEVICE — PERI-PAD,MODERATE: Brand: CURITY

## (undated) DEVICE — SOLUTION IRRIG 3000ML 0.9% SOD CHL FLX CONT 0797208] ICU MEDICAL INC]

## (undated) DEVICE — KENDALL SCD EXPRESS SLEEVES, KNEE LENGTH, MEDIUM: Brand: KENDALL SCD

## (undated) DEVICE — PVC URETHRAL CATHETER: Brand: DOVER

## (undated) DEVICE — DRAPE TWL SURG 16X26IN BLU ORB04] ALLCARE INC]

## (undated) DEVICE — TRAY PREP DRY W/ PREM GLV 2 APPL 6 SPNG 2 UNDPD 1 OVERWRAP

## (undated) DEVICE — GOWN,REINF,POLY,ECL,PP SLV,XL: Brand: MEDLINE